# Patient Record
Sex: MALE | Race: WHITE | NOT HISPANIC OR LATINO | URBAN - METROPOLITAN AREA
[De-identification: names, ages, dates, MRNs, and addresses within clinical notes are randomized per-mention and may not be internally consistent; named-entity substitution may affect disease eponyms.]

---

## 2014-08-29 RX ORDER — ASPIRIN/CALCIUM CARB/MAGNESIUM 324 MG
1 TABLET ORAL
Qty: 0 | Refills: 0 | COMMUNITY
Start: 2014-08-29

## 2018-12-04 ENCOUNTER — EMERGENCY (EMERGENCY)
Facility: HOSPITAL | Age: 83
LOS: 1 days | Discharge: HOME | End: 2018-12-04
Attending: EMERGENCY MEDICINE | Admitting: EMERGENCY MEDICINE
Payer: COMMERCIAL

## 2018-12-04 VITALS
DIASTOLIC BLOOD PRESSURE: 69 MMHG | OXYGEN SATURATION: 100 % | HEART RATE: 85 BPM | RESPIRATION RATE: 16 BRPM | SYSTOLIC BLOOD PRESSURE: 120 MMHG | TEMPERATURE: 98 F

## 2018-12-04 VITALS
DIASTOLIC BLOOD PRESSURE: 66 MMHG | WEIGHT: 195.11 LBS | SYSTOLIC BLOOD PRESSURE: 130 MMHG | HEIGHT: 67 IN | RESPIRATION RATE: 16 BRPM | HEART RATE: 71 BPM | OXYGEN SATURATION: 100 % | TEMPERATURE: 98 F

## 2018-12-04 LAB
ALBUMIN SERPL ELPH-MCNC: 3.8 G/DL — SIGNIFICANT CHANGE UP (ref 3.3–5)
ALP SERPL-CCNC: 53 U/L — SIGNIFICANT CHANGE UP (ref 40–120)
ALT FLD-CCNC: 18 U/L — SIGNIFICANT CHANGE UP (ref 12–78)
ANION GAP SERPL CALC-SCNC: 10 MMOL/L — SIGNIFICANT CHANGE UP (ref 5–17)
APPEARANCE UR: CLEAR — SIGNIFICANT CHANGE UP
AST SERPL-CCNC: 14 U/L — LOW (ref 15–37)
BASOPHILS # BLD AUTO: 0.02 K/UL — SIGNIFICANT CHANGE UP (ref 0–0.2)
BASOPHILS NFR BLD AUTO: 0.2 % — SIGNIFICANT CHANGE UP (ref 0–2)
BILIRUB SERPL-MCNC: 0.5 MG/DL — SIGNIFICANT CHANGE UP (ref 0.2–1.2)
BILIRUB UR-MCNC: NEGATIVE — SIGNIFICANT CHANGE UP
BUN SERPL-MCNC: 13 MG/DL — SIGNIFICANT CHANGE UP (ref 7–23)
CALCIUM SERPL-MCNC: 9.4 MG/DL — SIGNIFICANT CHANGE UP (ref 8.5–10.1)
CHLORIDE SERPL-SCNC: 104 MMOL/L — SIGNIFICANT CHANGE UP (ref 96–108)
CO2 SERPL-SCNC: 25 MMOL/L — SIGNIFICANT CHANGE UP (ref 22–31)
COLOR SPEC: YELLOW — SIGNIFICANT CHANGE UP
CREAT SERPL-MCNC: 0.84 MG/DL — SIGNIFICANT CHANGE UP (ref 0.5–1.3)
DIFF PNL FLD: NEGATIVE — SIGNIFICANT CHANGE UP
EOSINOPHIL # BLD AUTO: 0.1 K/UL — SIGNIFICANT CHANGE UP (ref 0–0.5)
EOSINOPHIL NFR BLD AUTO: 1 % — SIGNIFICANT CHANGE UP (ref 0–6)
GLUCOSE SERPL-MCNC: 146 MG/DL — HIGH (ref 70–99)
GLUCOSE UR QL: NEGATIVE — SIGNIFICANT CHANGE UP
HCT VFR BLD CALC: 32.8 % — LOW (ref 39–50)
HGB BLD-MCNC: 11.2 G/DL — LOW (ref 13–17)
IMM GRANULOCYTES NFR BLD AUTO: 0.5 % — SIGNIFICANT CHANGE UP (ref 0–1.5)
KETONES UR-MCNC: NEGATIVE — SIGNIFICANT CHANGE UP
LEUKOCYTE ESTERASE UR-ACNC: NEGATIVE — SIGNIFICANT CHANGE UP
LYMPHOCYTES # BLD AUTO: 1.19 K/UL — SIGNIFICANT CHANGE UP (ref 1–3.3)
LYMPHOCYTES # BLD AUTO: 11.5 % — LOW (ref 13–44)
MCHC RBC-ENTMCNC: 29.5 PG — SIGNIFICANT CHANGE UP (ref 27–34)
MCHC RBC-ENTMCNC: 34.1 GM/DL — SIGNIFICANT CHANGE UP (ref 32–36)
MCV RBC AUTO: 86.3 FL — SIGNIFICANT CHANGE UP (ref 80–100)
MONOCYTES # BLD AUTO: 0.73 K/UL — SIGNIFICANT CHANGE UP (ref 0–0.9)
MONOCYTES NFR BLD AUTO: 7.1 % — SIGNIFICANT CHANGE UP (ref 2–14)
NEUTROPHILS # BLD AUTO: 8.26 K/UL — HIGH (ref 1.8–7.4)
NEUTROPHILS NFR BLD AUTO: 79.7 % — HIGH (ref 43–77)
NITRITE UR-MCNC: NEGATIVE — SIGNIFICANT CHANGE UP
NRBC # BLD: 0 /100 WBCS — SIGNIFICANT CHANGE UP (ref 0–0)
PH UR: 5 — SIGNIFICANT CHANGE UP (ref 5–8)
PLATELET # BLD AUTO: 285 K/UL — SIGNIFICANT CHANGE UP (ref 150–400)
POTASSIUM SERPL-MCNC: 4 MMOL/L — SIGNIFICANT CHANGE UP (ref 3.5–5.3)
POTASSIUM SERPL-SCNC: 4 MMOL/L — SIGNIFICANT CHANGE UP (ref 3.5–5.3)
PROT SERPL-MCNC: 7.5 G/DL — SIGNIFICANT CHANGE UP (ref 6–8.3)
PROT UR-MCNC: NEGATIVE — SIGNIFICANT CHANGE UP
RBC # BLD: 3.8 M/UL — LOW (ref 4.2–5.8)
RBC # FLD: 12.5 % — SIGNIFICANT CHANGE UP (ref 10.3–14.5)
SODIUM SERPL-SCNC: 139 MMOL/L — SIGNIFICANT CHANGE UP (ref 135–145)
SP GR SPEC: 1.01 — SIGNIFICANT CHANGE UP (ref 1.01–1.02)
UROBILINOGEN FLD QL: NEGATIVE — SIGNIFICANT CHANGE UP
WBC # BLD: 10.35 K/UL — SIGNIFICANT CHANGE UP (ref 3.8–10.5)
WBC # FLD AUTO: 10.35 K/UL — SIGNIFICANT CHANGE UP (ref 3.8–10.5)

## 2018-12-04 PROCEDURE — 99283 EMERGENCY DEPT VISIT LOW MDM: CPT

## 2018-12-04 PROCEDURE — 99284 EMERGENCY DEPT VISIT MOD MDM: CPT

## 2018-12-04 PROCEDURE — 80053 COMPREHEN METABOLIC PANEL: CPT

## 2018-12-04 PROCEDURE — 36415 COLL VENOUS BLD VENIPUNCTURE: CPT

## 2018-12-04 PROCEDURE — 85027 COMPLETE CBC AUTOMATED: CPT

## 2018-12-04 PROCEDURE — 81003 URINALYSIS AUTO W/O SCOPE: CPT

## 2018-12-04 RX ORDER — SODIUM CHLORIDE 9 MG/ML
1000 INJECTION INTRAMUSCULAR; INTRAVENOUS; SUBCUTANEOUS ONCE
Qty: 0 | Refills: 0 | Status: COMPLETED | OUTPATIENT
Start: 2018-12-04 | End: 2018-12-04

## 2018-12-04 RX ADMIN — Medication 1 ENEMA: at 19:30

## 2018-12-04 RX ADMIN — SODIUM CHLORIDE 1000 MILLILITER(S): 9 INJECTION INTRAMUSCULAR; INTRAVENOUS; SUBCUTANEOUS at 18:21

## 2018-12-04 RX ADMIN — SODIUM CHLORIDE 2000 MILLILITER(S): 9 INJECTION INTRAMUSCULAR; INTRAVENOUS; SUBCUTANEOUS at 17:21

## 2018-12-04 NOTE — ED ADULT NURSE NOTE - PMH
BPH (benign prostatic hypertrophy)  with seed implant  Diabetes    DMII (diabetes mellitus, type 2)    HLD (hyperlipidemia)    HTN (hypertension)    HTN (hypertension)    Polyp of colon

## 2018-12-04 NOTE — ED PROVIDER NOTE - OBJECTIVE STATEMENT
Pt is and 82 yo male, pt states had some loose stool on saturday, but since with no bm since sunday at 4am and urge to defecate. pt with rectal pain, feels like stool stuck.  pt with abd pain cramping at times, no f/c, n/v.  no a/a factors, no dysuria.  pt unable to urinate since 2pm

## 2018-12-04 NOTE — ED PROVIDER NOTE - PROGRESS NOTE DETAILS
pt disimpacted digitally and enema placed and pt placed on bed pan pt with large bm in ed. pt now with no rectal pain, abd benign, d/c on colace fu gi, urol--pt able to urinate after bm

## 2018-12-04 NOTE — ED PROVIDER NOTE - PMH
BPH (benign prostatic hypertrophy)  with seed implant  Diabetes    DMII (diabetes mellitus, type 2)    HLD (hyperlipidemia)    HTN (hypertension)    HTN (hypertension)    Polyp of colon    Prostate cancer

## 2018-12-04 NOTE — ED ADULT NURSE REASSESSMENT NOTE - NS ED NURSE REASSESS COMMENT FT1
patient verbalized improvement and verbalized large about of stool passed out
1930 assumed care at this time, patient c/o rectal pain. Dr Juárez to do rectal impaction followed with Fleet enema

## 2018-12-04 NOTE — ED ADULT NURSE NOTE - NSIMPLEMENTINTERV_GEN_ALL_ED
Implemented All Fall Risk Interventions:  [place] to call system. Call bell, personal items and telephone within reach. Instruct patient to call for assistance. Room bathroom lighting operational. Non-slip footwear when patient is off stretcher. Physically safe environment: no spills, clutter or unnecessary equipment. Stretcher in lowest position, wheels locked, appropriate side rails in place. Provide visual cue, wrist band, yellow gown, etc. Monitor gait and stability. Monitor for mental status changes and reorient to person, place, and time. Review medications for side effects contributing to fall risk. Reinforce activity limits and safety measures with patient and family.

## 2018-12-04 NOTE — ED PROVIDER NOTE - CHPI ED SYMPTOMS NEG
no chills/no abdominal distension/no vomiting/no nausea/no dysuria/no hematuria/no blood in stool/no burning urination/no fever

## 2018-12-04 NOTE — ED PROVIDER NOTE - MEDICAL DECISION MAKING DETAILS
fecal impaction, alek haque, on st cath 1.5 liters urine but pt had been urinating till noon.  no bm since sunday with rectal pain, disimpacted and urinating normally, d/c on colace fu gi, urology

## 2018-12-21 ENCOUNTER — APPOINTMENT (OUTPATIENT)
Dept: GASTROENTEROLOGY | Facility: CLINIC | Age: 83
End: 2018-12-21
Payer: MEDICARE

## 2018-12-21 VITALS
BODY MASS INDEX: 31.39 KG/M2 | SYSTOLIC BLOOD PRESSURE: 138 MMHG | HEIGHT: 67 IN | HEART RATE: 68 BPM | WEIGHT: 200 LBS | TEMPERATURE: 98 F | OXYGEN SATURATION: 96 % | DIASTOLIC BLOOD PRESSURE: 80 MMHG

## 2018-12-21 DIAGNOSIS — Z80.0 FAMILY HISTORY OF MALIGNANT NEOPLASM OF DIGESTIVE ORGANS: ICD-10-CM

## 2018-12-21 DIAGNOSIS — Z78.9 OTHER SPECIFIED HEALTH STATUS: ICD-10-CM

## 2018-12-21 DIAGNOSIS — D50.0 IRON DEFICIENCY ANEMIA SECONDARY TO BLOOD LOSS (CHRONIC): ICD-10-CM

## 2018-12-21 DIAGNOSIS — K56.41 FECAL IMPACTION: ICD-10-CM

## 2018-12-21 DIAGNOSIS — Z85.46 PERSONAL HISTORY OF MALIGNANT NEOPLASM OF PROSTATE: ICD-10-CM

## 2018-12-21 DIAGNOSIS — Z86.39 PERSONAL HISTORY OF OTHER ENDOCRINE, NUTRITIONAL AND METABOLIC DISEASE: ICD-10-CM

## 2018-12-21 DIAGNOSIS — R19.8 OTHER SPECIFIED SYMPTOMS AND SIGNS INVOLVING THE DIGESTIVE SYSTEM AND ABDOMEN: ICD-10-CM

## 2018-12-21 PROCEDURE — 99203 OFFICE O/P NEW LOW 30 MIN: CPT

## 2018-12-21 RX ORDER — METOPROLOL SUCCINATE 100 MG/1
100 TABLET, EXTENDED RELEASE ORAL
Qty: 30 | Refills: 0 | Status: ACTIVE | COMMUNITY
Start: 2017-12-04

## 2018-12-21 RX ORDER — DORZOLAMIDE HYDROCHLORIDE TIMOLOL MALEATE 20; 5 MG/ML; MG/ML
22.3-6.8 SOLUTION/ DROPS OPHTHALMIC
Qty: 10 | Refills: 0 | Status: ACTIVE | COMMUNITY
Start: 2017-08-31

## 2018-12-21 RX ORDER — IRON/IRON ASP GLY/FA/MV-MIN 38 125-25-1MG
TABLET ORAL
Refills: 0 | Status: ACTIVE | COMMUNITY

## 2018-12-21 RX ORDER — ATORVASTATIN CALCIUM 40 MG/1
40 TABLET, FILM COATED ORAL
Qty: 30 | Refills: 0 | Status: ACTIVE | COMMUNITY
Start: 2017-12-04

## 2018-12-21 RX ORDER — MELOXICAM 15 MG/1
15 TABLET ORAL
Qty: 30 | Refills: 0 | Status: ACTIVE | COMMUNITY
Start: 2018-12-13

## 2018-12-21 RX ORDER — SULFAMETHOXAZOLE AND TRIMETHOPRIM 800; 160 MG/1; MG/1
800-160 TABLET ORAL
Qty: 28 | Refills: 0 | Status: ACTIVE | COMMUNITY
Start: 2018-07-24

## 2018-12-21 RX ORDER — TAMSULOSIN HYDROCHLORIDE 0.4 MG/1
0.4 CAPSULE ORAL
Qty: 60 | Refills: 0 | Status: ACTIVE | COMMUNITY
Start: 2018-09-28

## 2018-12-21 RX ORDER — METFORMIN HYDROCHLORIDE 500 MG/1
500 TABLET, COATED ORAL
Qty: 90 | Refills: 0 | Status: ACTIVE | COMMUNITY
Start: 2017-12-04

## 2018-12-21 RX ORDER — POLYETHYLENE GLYCOL 3350, SODIUM SULFATE, SODIUM CHLORIDE, POTASSIUM CHLORIDE, ASCORBIC ACID, SODIUM ASCORBATE 7.5-2.691G
100 KIT ORAL
Qty: 1 | Refills: 0 | Status: ACTIVE | COMMUNITY
Start: 2018-12-21 | End: 1900-01-01

## 2018-12-21 RX ORDER — CRANBERRY FRUIT EXTRACT 650 MG
CAPSULE ORAL
Refills: 0 | Status: ACTIVE | COMMUNITY

## 2018-12-21 RX ORDER — ENZALUTAMIDE 40 MG/1
40 CAPSULE ORAL
Refills: 0 | Status: ACTIVE | COMMUNITY

## 2019-01-31 ENCOUNTER — OTHER (OUTPATIENT)
Age: 84
End: 2019-01-31

## 2019-02-06 ENCOUNTER — INPATIENT (INPATIENT)
Facility: HOSPITAL | Age: 84
LOS: 6 days | Discharge: HOME | DRG: 723 | End: 2019-02-13
Attending: HOSPITALIST | Admitting: HOSPITALIST
Payer: COMMERCIAL

## 2019-02-06 VITALS — WEIGHT: 195.11 LBS | HEIGHT: 68 IN

## 2019-02-06 LAB
ALBUMIN SERPL ELPH-MCNC: 3.7 G/DL — SIGNIFICANT CHANGE UP (ref 3.3–5)
ALP SERPL-CCNC: 63 U/L — SIGNIFICANT CHANGE UP (ref 40–120)
ALT FLD-CCNC: 24 U/L — SIGNIFICANT CHANGE UP (ref 12–78)
ANION GAP SERPL CALC-SCNC: 7 MMOL/L — SIGNIFICANT CHANGE UP (ref 5–17)
APPEARANCE UR: ABNORMAL
APTT BLD: 32.3 SEC — SIGNIFICANT CHANGE UP (ref 27.5–36.3)
AST SERPL-CCNC: 17 U/L — SIGNIFICANT CHANGE UP (ref 15–37)
BACTERIA # UR AUTO: ABNORMAL
BASOPHILS # BLD AUTO: 0.02 K/UL — SIGNIFICANT CHANGE UP (ref 0–0.2)
BASOPHILS NFR BLD AUTO: 0.3 % — SIGNIFICANT CHANGE UP (ref 0–2)
BILIRUB SERPL-MCNC: 0.2 MG/DL — SIGNIFICANT CHANGE UP (ref 0.2–1.2)
BILIRUB UR-MCNC: NEGATIVE — SIGNIFICANT CHANGE UP
BLD GP AB SCN SERPL QL: SIGNIFICANT CHANGE UP
BUN SERPL-MCNC: 18 MG/DL — SIGNIFICANT CHANGE UP (ref 7–23)
CALCIUM SERPL-MCNC: 8.8 MG/DL — SIGNIFICANT CHANGE UP (ref 8.5–10.1)
CHLORIDE SERPL-SCNC: 107 MMOL/L — SIGNIFICANT CHANGE UP (ref 96–108)
CO2 SERPL-SCNC: 28 MMOL/L — SIGNIFICANT CHANGE UP (ref 22–31)
COLOR SPEC: ABNORMAL
CREAT SERPL-MCNC: 0.94 MG/DL — SIGNIFICANT CHANGE UP (ref 0.5–1.3)
DIFF PNL FLD: ABNORMAL
EOSINOPHIL # BLD AUTO: 0.25 K/UL — SIGNIFICANT CHANGE UP (ref 0–0.5)
EOSINOPHIL NFR BLD AUTO: 3.7 % — SIGNIFICANT CHANGE UP (ref 0–6)
EPI CELLS # UR: SIGNIFICANT CHANGE UP
GLUCOSE SERPL-MCNC: 155 MG/DL — HIGH (ref 70–99)
GLUCOSE UR QL: NEGATIVE — SIGNIFICANT CHANGE UP
HCT VFR BLD CALC: 31.3 % — LOW (ref 39–50)
HGB BLD-MCNC: 9.9 G/DL — LOW (ref 13–17)
IMM GRANULOCYTES NFR BLD AUTO: 0.4 % — SIGNIFICANT CHANGE UP (ref 0–1.5)
INR BLD: 1.27 RATIO — HIGH (ref 0.88–1.16)
KETONES UR-MCNC: ABNORMAL
LEUKOCYTE ESTERASE UR-ACNC: NEGATIVE — SIGNIFICANT CHANGE UP
LYMPHOCYTES # BLD AUTO: 1.38 K/UL — SIGNIFICANT CHANGE UP (ref 1–3.3)
LYMPHOCYTES # BLD AUTO: 20.2 % — SIGNIFICANT CHANGE UP (ref 13–44)
MCHC RBC-ENTMCNC: 28.5 PG — SIGNIFICANT CHANGE UP (ref 27–34)
MCHC RBC-ENTMCNC: 31.6 GM/DL — LOW (ref 32–36)
MCV RBC AUTO: 90.2 FL — SIGNIFICANT CHANGE UP (ref 80–100)
MONOCYTES # BLD AUTO: 0.64 K/UL — SIGNIFICANT CHANGE UP (ref 0–0.9)
MONOCYTES NFR BLD AUTO: 9.4 % — SIGNIFICANT CHANGE UP (ref 2–14)
NEUTROPHILS # BLD AUTO: 4.51 K/UL — SIGNIFICANT CHANGE UP (ref 1.8–7.4)
NEUTROPHILS NFR BLD AUTO: 66 % — SIGNIFICANT CHANGE UP (ref 43–77)
NITRITE UR-MCNC: NEGATIVE — SIGNIFICANT CHANGE UP
NRBC # BLD: 0 /100 WBCS — SIGNIFICANT CHANGE UP (ref 0–0)
PH UR: 7 — SIGNIFICANT CHANGE UP (ref 5–8)
PLATELET # BLD AUTO: 291 K/UL — SIGNIFICANT CHANGE UP (ref 150–400)
POTASSIUM SERPL-MCNC: 3.7 MMOL/L — SIGNIFICANT CHANGE UP (ref 3.5–5.3)
POTASSIUM SERPL-SCNC: 3.7 MMOL/L — SIGNIFICANT CHANGE UP (ref 3.5–5.3)
PROT SERPL-MCNC: 6.8 G/DL — SIGNIFICANT CHANGE UP (ref 6–8.3)
PROT UR-MCNC: 500 MG/DL
PROTHROM AB SERPL-ACNC: 14.5 SEC — HIGH (ref 10–12.9)
RBC # BLD: 3.47 M/UL — LOW (ref 4.2–5.8)
RBC # FLD: 13.5 % — SIGNIFICANT CHANGE UP (ref 10.3–14.5)
RBC CASTS # UR COMP ASSIST: >50 /HPF (ref 0–4)
SODIUM SERPL-SCNC: 142 MMOL/L — SIGNIFICANT CHANGE UP (ref 135–145)
SP GR SPEC: 1 — LOW (ref 1.01–1.02)
UROBILINOGEN FLD QL: NEGATIVE — SIGNIFICANT CHANGE UP
WBC # BLD: 6.83 K/UL — SIGNIFICANT CHANGE UP (ref 3.8–10.5)
WBC # FLD AUTO: 6.83 K/UL — SIGNIFICANT CHANGE UP (ref 3.8–10.5)
WBC UR QL: SIGNIFICANT CHANGE UP

## 2019-02-06 PROCEDURE — 99285 EMERGENCY DEPT VISIT HI MDM: CPT

## 2019-02-06 RX ORDER — SODIUM CHLORIDE 9 MG/ML
3 INJECTION INTRAMUSCULAR; INTRAVENOUS; SUBCUTANEOUS ONCE
Qty: 0 | Refills: 0 | Status: COMPLETED | OUTPATIENT
Start: 2019-02-06 | End: 2019-02-06

## 2019-02-06 RX ADMIN — SODIUM CHLORIDE 3 MILLILITER(S): 9 INJECTION INTRAMUSCULAR; INTRAVENOUS; SUBCUTANEOUS at 21:53

## 2019-02-06 NOTE — ED PROVIDER NOTE - PROGRESS NOTE DETAILS
Pt with persistent bleeding, will admit for further vicente /e shannon. Dw Dr Elizondo ( [facility]), they will see pt, admit to med. Dr Dr Barraza ([facility]), will see pt to admit.

## 2019-02-06 NOTE — ED PROVIDER NOTE - OBJECTIVE STATEMENT
82 yo M p/w sp felix placed 2 weeks ago for urinary obst. Pt with occ slight hematuria on / off. Now with inc hematuria x past 2 days, worse with steady dark blood in urine since this afternoon. No abd pain. no n/v/d. no neck / back pain. No agg/allev factors. no other inj or co.

## 2019-02-07 ENCOUNTER — OTHER (OUTPATIENT)
Age: 84
End: 2019-02-07

## 2019-02-07 DIAGNOSIS — D64.9 ANEMIA, UNSPECIFIED: ICD-10-CM

## 2019-02-07 DIAGNOSIS — R31.9 HEMATURIA, UNSPECIFIED: ICD-10-CM

## 2019-02-07 DIAGNOSIS — C61 MALIGNANT NEOPLASM OF PROSTATE: ICD-10-CM

## 2019-02-07 DIAGNOSIS — I10 ESSENTIAL (PRIMARY) HYPERTENSION: ICD-10-CM

## 2019-02-07 DIAGNOSIS — R31.0 GROSS HEMATURIA: ICD-10-CM

## 2019-02-07 DIAGNOSIS — E11.9 TYPE 2 DIABETES MELLITUS WITHOUT COMPLICATIONS: ICD-10-CM

## 2019-02-07 DIAGNOSIS — Z29.9 ENCOUNTER FOR PROPHYLACTIC MEASURES, UNSPECIFIED: ICD-10-CM

## 2019-02-07 DIAGNOSIS — N40.0 BENIGN PROSTATIC HYPERPLASIA WITHOUT LOWER URINARY TRACT SYMPTOMS: ICD-10-CM

## 2019-02-07 DIAGNOSIS — E78.5 HYPERLIPIDEMIA, UNSPECIFIED: ICD-10-CM

## 2019-02-07 LAB
ALBUMIN SERPL ELPH-MCNC: 3.4 G/DL — SIGNIFICANT CHANGE UP (ref 3.3–5)
ALP SERPL-CCNC: 56 U/L — SIGNIFICANT CHANGE UP (ref 40–120)
ALT FLD-CCNC: 21 U/L — SIGNIFICANT CHANGE UP (ref 12–78)
ANION GAP SERPL CALC-SCNC: 6 MMOL/L — SIGNIFICANT CHANGE UP (ref 5–17)
AST SERPL-CCNC: 14 U/L — LOW (ref 15–37)
BILIRUB SERPL-MCNC: 0.5 MG/DL — SIGNIFICANT CHANGE UP (ref 0.2–1.2)
BUN SERPL-MCNC: 15 MG/DL — SIGNIFICANT CHANGE UP (ref 7–23)
CALCIUM SERPL-MCNC: 8.6 MG/DL — SIGNIFICANT CHANGE UP (ref 8.5–10.1)
CHLORIDE SERPL-SCNC: 109 MMOL/L — HIGH (ref 96–108)
CO2 SERPL-SCNC: 29 MMOL/L — SIGNIFICANT CHANGE UP (ref 22–31)
CREAT SERPL-MCNC: 0.85 MG/DL — SIGNIFICANT CHANGE UP (ref 0.5–1.3)
GLUCOSE SERPL-MCNC: 144 MG/DL — HIGH (ref 70–99)
HCT VFR BLD CALC: 29.6 % — LOW (ref 39–50)
HGB BLD-MCNC: 9.5 G/DL — LOW (ref 13–17)
MAGNESIUM SERPL-MCNC: 2.1 MG/DL — SIGNIFICANT CHANGE UP (ref 1.6–2.6)
MCHC RBC-ENTMCNC: 28.9 PG — SIGNIFICANT CHANGE UP (ref 27–34)
MCHC RBC-ENTMCNC: 32.1 GM/DL — SIGNIFICANT CHANGE UP (ref 32–36)
MCV RBC AUTO: 90 FL — SIGNIFICANT CHANGE UP (ref 80–100)
NRBC # BLD: 0 /100 WBCS — SIGNIFICANT CHANGE UP (ref 0–0)
PHOSPHATE SERPL-MCNC: 4.3 MG/DL — SIGNIFICANT CHANGE UP (ref 2.5–4.5)
PLATELET # BLD AUTO: 273 K/UL — SIGNIFICANT CHANGE UP (ref 150–400)
POTASSIUM SERPL-MCNC: 4 MMOL/L — SIGNIFICANT CHANGE UP (ref 3.5–5.3)
POTASSIUM SERPL-SCNC: 4 MMOL/L — SIGNIFICANT CHANGE UP (ref 3.5–5.3)
PROT SERPL-MCNC: 6.3 G/DL — SIGNIFICANT CHANGE UP (ref 6–8.3)
RBC # BLD: 3.29 M/UL — LOW (ref 4.2–5.8)
RBC # FLD: 13.4 % — SIGNIFICANT CHANGE UP (ref 10.3–14.5)
SODIUM SERPL-SCNC: 144 MMOL/L — SIGNIFICANT CHANGE UP (ref 135–145)
WBC # BLD: 7.22 K/UL — SIGNIFICANT CHANGE UP (ref 3.8–10.5)
WBC # FLD AUTO: 7.22 K/UL — SIGNIFICANT CHANGE UP (ref 3.8–10.5)

## 2019-02-07 PROCEDURE — 12345: CPT | Mod: NC

## 2019-02-07 PROCEDURE — 99223 1ST HOSP IP/OBS HIGH 75: CPT | Mod: AI,GC

## 2019-02-07 PROCEDURE — 93010 ELECTROCARDIOGRAM REPORT: CPT

## 2019-02-07 RX ORDER — INSULIN LISPRO 100/ML
VIAL (ML) SUBCUTANEOUS AT BEDTIME
Qty: 0 | Refills: 0 | Status: DISCONTINUED | OUTPATIENT
Start: 2019-02-07 | End: 2019-02-13

## 2019-02-07 RX ORDER — TAMSULOSIN HYDROCHLORIDE 0.4 MG/1
2 CAPSULE ORAL
Qty: 0 | Refills: 0 | COMMUNITY

## 2019-02-07 RX ORDER — DEXTROSE 50 % IN WATER 50 %
15 SYRINGE (ML) INTRAVENOUS ONCE
Qty: 0 | Refills: 0 | Status: DISCONTINUED | OUTPATIENT
Start: 2019-02-07 | End: 2019-02-13

## 2019-02-07 RX ORDER — GLUCAGON INJECTION, SOLUTION 0.5 MG/.1ML
1 INJECTION, SOLUTION SUBCUTANEOUS ONCE
Qty: 0 | Refills: 0 | Status: DISCONTINUED | OUTPATIENT
Start: 2019-02-07 | End: 2019-02-13

## 2019-02-07 RX ORDER — DEXTROSE 50 % IN WATER 50 %
25 SYRINGE (ML) INTRAVENOUS ONCE
Qty: 0 | Refills: 0 | Status: DISCONTINUED | OUTPATIENT
Start: 2019-02-07 | End: 2019-02-13

## 2019-02-07 RX ORDER — DOCUSATE SODIUM 100 MG
100 CAPSULE ORAL THREE TIMES A DAY
Qty: 0 | Refills: 0 | Status: DISCONTINUED | OUTPATIENT
Start: 2019-02-07 | End: 2019-02-13

## 2019-02-07 RX ORDER — GABAPENTIN 400 MG/1
1 CAPSULE ORAL
Qty: 0 | Refills: 0 | COMMUNITY

## 2019-02-07 RX ORDER — METOPROLOL TARTRATE 50 MG
50 TABLET ORAL
Qty: 0 | Refills: 0 | Status: DISCONTINUED | OUTPATIENT
Start: 2019-02-07 | End: 2019-02-13

## 2019-02-07 RX ORDER — ATORVASTATIN CALCIUM 80 MG/1
1 TABLET, FILM COATED ORAL
Qty: 0 | Refills: 0 | COMMUNITY

## 2019-02-07 RX ORDER — SENNA PLUS 8.6 MG/1
2 TABLET ORAL AT BEDTIME
Qty: 0 | Refills: 0 | Status: DISCONTINUED | OUTPATIENT
Start: 2019-02-07 | End: 2019-02-10

## 2019-02-07 RX ORDER — ATORVASTATIN CALCIUM 80 MG/1
80 TABLET, FILM COATED ORAL AT BEDTIME
Qty: 0 | Refills: 0 | Status: DISCONTINUED | OUTPATIENT
Start: 2019-02-07 | End: 2019-02-13

## 2019-02-07 RX ORDER — METOPROLOL TARTRATE 50 MG
1 TABLET ORAL
Qty: 0 | Refills: 0 | COMMUNITY

## 2019-02-07 RX ORDER — METFORMIN HYDROCHLORIDE 850 MG/1
1 TABLET ORAL
Qty: 0 | Refills: 0 | COMMUNITY

## 2019-02-07 RX ORDER — INSULIN LISPRO 100/ML
VIAL (ML) SUBCUTANEOUS
Qty: 0 | Refills: 0 | Status: DISCONTINUED | OUTPATIENT
Start: 2019-02-07 | End: 2019-02-13

## 2019-02-07 RX ORDER — DEXTROSE 50 % IN WATER 50 %
12.5 SYRINGE (ML) INTRAVENOUS ONCE
Qty: 0 | Refills: 0 | Status: DISCONTINUED | OUTPATIENT
Start: 2019-02-07 | End: 2019-02-13

## 2019-02-07 RX ORDER — SODIUM CHLORIDE 9 MG/ML
1000 INJECTION, SOLUTION INTRAVENOUS
Qty: 0 | Refills: 0 | Status: DISCONTINUED | OUTPATIENT
Start: 2019-02-07 | End: 2019-02-13

## 2019-02-07 RX ORDER — TAMSULOSIN HYDROCHLORIDE 0.4 MG/1
0.8 CAPSULE ORAL AT BEDTIME
Qty: 0 | Refills: 0 | Status: DISCONTINUED | OUTPATIENT
Start: 2019-02-07 | End: 2019-02-13

## 2019-02-07 RX ADMIN — Medication 50 MILLIGRAM(S): at 06:43

## 2019-02-07 RX ADMIN — Medication 50 MILLIGRAM(S): at 18:38

## 2019-02-07 RX ADMIN — Medication 100 MILLIGRAM(S): at 06:43

## 2019-02-07 RX ADMIN — Medication 100 MILLIGRAM(S): at 22:57

## 2019-02-07 RX ADMIN — ATORVASTATIN CALCIUM 80 MILLIGRAM(S): 80 TABLET, FILM COATED ORAL at 22:56

## 2019-02-07 RX ADMIN — TAMSULOSIN HYDROCHLORIDE 0.8 MILLIGRAM(S): 0.4 CAPSULE ORAL at 22:57

## 2019-02-07 NOTE — H&P ADULT - PROBLEM SELECTOR PLAN 8
IMPROVE VTE Individual Risk Assessment          RISK                                                          Points  [  ] Previous VTE                                                3  [  ] Thrombophilia                                             2  [  ] Lower limb paralysis                                   2        (unable to hold up >15 seconds)    [ x ] Current Cancer                                             2         (within 6 months)  [  ] Immobilization > 24 hrs                              1  [  ] ICU/CCU stay > 24 hours                             1  [ x ] Age > 60                                                         1    IMPROVE VTE Score: 3    Given setting of gross hematuria, will hold AC. Place on SCDs.

## 2019-02-07 NOTE — H&P ADULT - PROBLEM SELECTOR PLAN 7
- Chronic.  - Hold home metformin.  - POCT glucose monitoring.  - Low dose insulin sliding scale.  - DASH, carb conscious diet.

## 2019-02-07 NOTE — H&P ADULT - PROBLEM SELECTOR PLAN 2
- Acute, Hgb 9.9, baseline 11.2.   - Secondary to acute blood loss from hematuria.  - Monitor for symptoms of acute blood loss.  - Follow up AM H/H.   - Consider need for transfusion.

## 2019-02-07 NOTE — H&P ADULT - PROBLEM SELECTOR PLAN 1
- Acute.  - History of prostate cancer, s/p seeding, currently on Extandi.  - Continuous bladder irrigation began in ED.  - Admit to [facility].   - Continue bladder irrigation.   - Urology Dr. Elizondo consulted.

## 2019-02-07 NOTE — H&P ADULT - NSHPSOCIALHISTORY_GEN_ALL_CORE
Patient lives at home with wife.   Ambulates without assistance, sometimes uses wife's cane.   Ex-smoker, quit 50 years ago. Smoked since age 16, 1ppd.

## 2019-02-07 NOTE — PATIENT PROFILE ADULT - VISION (WITH CORRECTIVE LENSES IF THE PATIENT USUALLY WEARS THEM):
Partially impaired: cannot see medication labels or newsprint, but can see obstacles in path, and the surrounding layout; can count fingers at arm's length/with corrective eye glassess

## 2019-02-07 NOTE — H&P ADULT - NSHPPHYSICALEXAM_GEN_ALL_CORE
Physical Exam:  General: well-developed, well-nourished, NAD  HEENT: normocephalic, atraumatic, EOMI, moist mucous membranes   Neck: supple, non-tender, no masses  Neurology: AAOx3, sensation intact  Respiratory: clear to auscultation bilaterally; no wheezes, rhonchi, or rales  CV: regular rate and rhythm, soft S1/S2, no murmurs, rubs, or gallops  Abdominal: soft, non-tender, non-distended, bowel sounds decreased; Timmons in place draining   Extremities: no clubbing, cyanosis, or edema; palpable peripheral pulses  Musculoskeletal: no joint erythema or warmth, no joint swelling   Skin: warm, dry, normal color

## 2019-02-07 NOTE — H&P ADULT - PROBLEM SELECTOR PLAN 3
- Chronic, s/p seeding.  - On Extandi oral chemotherapy.  - Patient to bring in chemo medication from home.

## 2019-02-07 NOTE — PROGRESS NOTE ADULT - ASSESSMENT
Patient is an 83 year old male with PMH of prostate cancer s/p seeding and on oral chemotherapy, HTN, HLD, DM type 2 who presents to the ED with gross hematuria and acute blood loss anemia, requiring CBI

## 2019-02-07 NOTE — CONSULT NOTE ADULT - PROBLEM SELECTOR RECOMMENDATION 2
With clot retention. Continue CBI and manual irrigation as needed. With clot retention. Continue CBI and manual irrigation as needed. Stool softener.

## 2019-02-07 NOTE — H&P ADULT - NSHPREVIEWOFSYSTEMS_GEN_ALL_CORE
Constitutional: Denies fever, chills, general malaise, weight loss, weight gain  Respiratory: Denies shortness of breath, dyspnea on exertion, cough, sputum production, wheezing  Cardiovascular: Denies chest pain, palpitations; + lower extremity edema  Gastrointestinal: +constipation, abdominal fullness; Denies nausea, vomiting, diarrhea, abdominal pain, melena, hematochezia   Genitourinary: +burning at Timmons site, hematuria, incontinence; Denies frequency, urgency  Musculoskeletal: Denies muscle pains, muscle weakness, joint pain or swelling  Neurologic: Denies syncope, loss of consciousness, headache, weakness, dizziness  Psychiatric: Denies feeling anxious, depressed, suicidal, or homicidal thoughts  Endocrine: Denies cold or heat intolerance, polydipsia, polyphagia   ROS negative except as noted above

## 2019-02-07 NOTE — H&P ADULT - PMH
BPH (benign prostatic hypertrophy)  with seed implant  DMII (diabetes mellitus, type 2)    HLD (hyperlipidemia)    HTN (hypertension)    Polyp of colon    Prostate cancer  with seed implant and on Xtandi

## 2019-02-07 NOTE — CONSULT NOTE ADULT - PROBLEM SELECTOR RECOMMENDATION 9
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

## 2019-02-07 NOTE — PROGRESS NOTE ADULT - PROBLEM SELECTOR PLAN 1
- Acute.  - History of prostate cancer, s/p seeding, currently on Extandi and Lupron Q 3 months (next dose in April)  -c/w continuous bladder irrigation   Appreciate urology input - Acute.  - History of prostate cancer, s/p seeding, currently on Extandi and Lupron Q 3 months (next dose in April)  -c/w continuous bladder irrigation   Appreciate urology input  f/u urine cx - Acute.  - History of prostate cancer, s/p seeding, currently on Extandi and Lupron Q 3 months (next dose in April)  -c/w continuous bladder irrigation   Appreciate urology input  f/u urine cx  holding ASA and Meloxicam at this time

## 2019-02-07 NOTE — CONSULT NOTE ADULT - SUBJECTIVE AND OBJECTIVE BOX
CHIEF COMPLAINT: Gross hematuria    HISTORY OF PRESENT ILLNESS:    The patient is an 83 year old male cared for by Dr. Can El with gross hematuria and clot retention.  Over the past several weeks he has been developing worsening gross hematuria.  CT Abdomen and Pelvis with/without IV contrast 19 performed at [facility] demonstrated small right renal calculi and diffse bladder wall thickeining of uncertain etiology.  He has a h/o prostate cancer diagnosed in  treated with XRT and brachytherapy with subsequent development of recurrence on ADT with Lupron. 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  He has known osseous metastases treated with Xofigo s/p Provenge.  He has had a Timmons in place for gross hematuria and clot retention.  Timmons became occluded and he presented to the ED for evaluation. Timmons changed to 3 way and CBI initiated.    PAST MEDICAL & SURGICAL HISTORY:  Prostate cancer: with seed implant and on Xtandi  HLD (hyperlipidemia)  BPH (benign prostatic hypertrophy): with seed implant  DMII (diabetes mellitus, type 2)  HTN (hypertension)  Polyp of colon  Bilateral cataracts      REVIEW OF SYSTEMS:    CONSTITUTIONAL: No weakness, fevers or chills  EYES/ENT: No visual changes;  No vertigo or throat pain   NECK: No pain or stiffness  RESPIRATORY: No cough, wheezing, hemoptysis; No shortness of breath  CARDIOVASCULAR: No chest pain or palpitations  GASTROINTESTINAL: No abdominal or epigastric pain. No nausea, vomiting, or hematemesis; No diarrhea or constipation. No melena or hematochezia.  GENITOURINARY:as above  NEUROLOGICAL: No numbness or weakness  SKIN: No itching, burning, rashes, or lesions   All other review of systems is negative unless indicated above.    MEDICATIONS  (STANDING):  atorvastatin 80 milliGRAM(s) Oral at bedtime  dextrose 5%. 1000 milliLiter(s) (50 mL/Hr) IV Continuous <Continuous>  dextrose 50% Injectable 12.5 Gram(s) IV Push once  dextrose 50% Injectable 25 Gram(s) IV Push once  dextrose 50% Injectable 25 Gram(s) IV Push once  docusate sodium 100 milliGRAM(s) Oral three times a day  insulin lispro (HumaLOG) corrective regimen sliding scale   SubCutaneous three times a day before meals  insulin lispro (HumaLOG) corrective regimen sliding scale   SubCutaneous at bedtime  metoprolol tartrate 50 milliGRAM(s) Oral two times a day    MEDICATIONS  (PRN):  dextrose 40% Gel 15 Gram(s) Oral once PRN Blood Glucose LESS THAN 70 milliGRAM(s)/deciliter  glucagon  Injectable 1 milliGRAM(s) IntraMuscular once PRN Glucose LESS THAN 70 milligrams/deciliter  senna 2 Tablet(s) Oral at bedtime PRN Constipation      Allergies    No Known Allergies    Intolerances        SOCIAL HISTORY:    FAMILY HISTORY:  Family history of MI (myocardial infarction) (Father)      Vital Signs Last 24 Hrs  T(C): 36.8 (2019 06:18), Max: 36.9 (2019 20:15)  T(F): 98.2 (2019 06:18), Max: 98.4 (2019 20:15)  HR: 65 (2019 06:18) (65 - 77)  BP: 167/65 (2019 06:18) (146/92 - 167/65)  BP(mean): --  RR: 16 (2019 06:18) (14 - 18)  SpO2: 97% (2019 06:18) (96% - 98%)    PHYSICAL EXAM:    Constitutional: NAD, well-developed  HEENT:  Normal Hearing  Neck: No LAD, No JVD  Back: Normal spine flexure, No CVA tenderness  Abd: Soft, NT/ND, No CVAT  : Normal phallus,open meatus,bilateral descended testes, no masses. 3 way Timmons in place. Urine bloody  Extremities: No peripheral edema  Neurological: A/O x 3  Psychiatric: Normal mood, normal affect  Skin: No rashes    LABS:                        9.9    6.83  )-----------( 291      ( 2019 22:30 )             31.3     02-06    142  |  107  |  18  ----------------------------<  155<H>  3.7   |  28  |  0.94    Ca    8.8      2019 22:30    TPro  6.8  /  Alb  3.7  /  TBili  0.2  /  DBili  x   /  AST  17  /  ALT  24  /  AlkPhos  63  02-06    PT/INR - ( 2019 22:30 )   PT: 14.5 sec;   INR: 1.27 ratio         PTT - ( 2019 22:30 )  PTT:32.3 sec  Urinalysis Basic - ( 2019 22:30 )    Color: Red / Appearance: bloody / S.005 / pH: x  Gluc: x / Ketone: Small  / Bili: Negative / Urobili: Negative   Blood: x / Protein: 500 mg/dL / Nitrite: Negative   Leuk Esterase: Negative / RBC: >50 /HPF / WBC 3-5   Sq Epi: x / Non Sq Epi: Occasional / Bacteria: Few CHIEF COMPLAINT: Gross hematuria    HISTORY OF PRESENT ILLNESS:    The patient is an 83 year old male cared for by Dr. Can El with gross hematuria and clot retention.  Over the past several weeks he has been developing worsening gross hematuria.  CT Abdomen and Pelvis with/without IV contrast 19 performed at [facility], [place] demonstrated small right renal calculi and diffse bladder wall thickeining of uncertain etiology.  He has a h/o prostate cancer diagnosed in  treated with XRT and brachytherapy with subsequent development of recurrence on ADT with Lupron. 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  He has known osseous metastases treated with Xofigo s/p Provenge.  He has had a Timmons in place for gross hematuria and clot retention.  Timmons became occluded and he presented to the ED for evaluation. Timmons changed to 3 way and CBI initiated. Patient has been straining to defacate exacerbating the bleeding.    PAST MEDICAL & SURGICAL HISTORY:  Prostate cancer: with seed implant and on Xtandi  HLD (hyperlipidemia)  BPH (benign prostatic hypertrophy): with seed implant  DMII (diabetes mellitus, type 2)  HTN (hypertension)  Polyp of colon  Bilateral cataracts      REVIEW OF SYSTEMS:    CONSTITUTIONAL: No weakness, fevers or chills  EYES/ENT: No visual changes;  No vertigo or throat pain   NECK: No pain or stiffness  RESPIRATORY: No cough, wheezing, hemoptysis; No shortness of breath  CARDIOVASCULAR: No chest pain or palpitations  GASTROINTESTINAL: No abdominal or epigastric pain. No nausea, vomiting, or hematemesis; No diarrhea or constipation. No melena or hematochezia.  GENITOURINARY:as above  NEUROLOGICAL: No numbness or weakness  SKIN: No itching, burning, rashes, or lesions   All other review of systems is negative unless indicated above.    MEDICATIONS  (STANDING):  atorvastatin 80 milliGRAM(s) Oral at bedtime  dextrose 5%. 1000 milliLiter(s) (50 mL/Hr) IV Continuous <Continuous>  dextrose 50% Injectable 12.5 Gram(s) IV Push once  dextrose 50% Injectable 25 Gram(s) IV Push once  dextrose 50% Injectable 25 Gram(s) IV Push once  docusate sodium 100 milliGRAM(s) Oral three times a day  insulin lispro (HumaLOG) corrective regimen sliding scale   SubCutaneous three times a day before meals  insulin lispro (HumaLOG) corrective regimen sliding scale   SubCutaneous at bedtime  metoprolol tartrate 50 milliGRAM(s) Oral two times a day    MEDICATIONS  (PRN):  dextrose 40% Gel 15 Gram(s) Oral once PRN Blood Glucose LESS THAN 70 milliGRAM(s)/deciliter  glucagon  Injectable 1 milliGRAM(s) IntraMuscular once PRN Glucose LESS THAN 70 milligrams/deciliter  senna 2 Tablet(s) Oral at bedtime PRN Constipation      Allergies    No Known Allergies    Intolerances        SOCIAL HISTORY:    FAMILY HISTORY:  Family history of MI (myocardial infarction) (Father)      Vital Signs Last 24 Hrs  T(C): 36.8 (2019 06:18), Max: 36.9 (2019 20:15)  T(F): 98.2 (2019 06:18), Max: 98.4 (2019 20:15)  HR: 65 (2019 06:18) (65 - 77)  BP: 167/65 (2019 06:18) (146/92 - 167/65)  BP(mean): --  RR: 16 (2019 06:18) (14 - 18)  SpO2: 97% (2019 06:18) (96% - 98%)    PHYSICAL EXAM:    Constitutional: NAD, well-developed  HEENT:  Normal Hearing  Neck: No LAD, No JVD  Back: Normal spine flexure, No CVA tenderness  Abd: Soft, NT/ND, No CVAT  : Normal phallus,open meatus,bilateral descended testes, no masses. 3 way Timmons in place. Urine bloody. Clots irrigated until urine punch colored on CBI  Extremities: Mild peripheral edema LEs  Neurological: A/O x 3  Psychiatric: Normal mood, normal affect  Skin: No rashes    LABS:                        9.9    6.83  )-----------( 291      ( 2019 22:30 )             31.3     02-06    142  |  107  |  18  ----------------------------<  155<H>  3.7   |  28  |  0.94    Ca    8.8      2019 22:30    TPro  6.8  /  Alb  3.7  /  TBili  0.2  /  DBili  x   /  AST  17  /  ALT  24  /  AlkPhos  63  02-06    PT/INR - ( 2019 22:30 )   PT: 14.5 sec;   INR: 1.27 ratio         PTT - ( 2019 22:30 )  PTT:32.3 sec  Urinalysis Basic - ( 2019 22:30 )    Color: Red / Appearance: bloody / S.005 / pH: x  Gluc: x / Ketone: Small  / Bili: Negative / Urobili: Negative   Blood: x / Protein: 500 mg/dL / Nitrite: Negative   Leuk Esterase: Negative / RBC: >50 /HPF / WBC 3-5   Sq Epi: x / Non Sq Epi: Occasional / Bacteria: Few

## 2019-02-07 NOTE — PROGRESS NOTE ADULT - SUBJECTIVE AND OBJECTIVE BOX
Contact Number: [phone]    Patient is a 83y old  Male who presents with a chief complaint of Hematuria (2019 06:42)      SUBJECTIVE / OVERNIGHT EVENTS: punched colored urine noted in Timmons tubing, has CBI running. Denies abd pain, CP, SOB. Denies fevers, chills.    MEDICATIONS  (STANDING):  atorvastatin 80 milliGRAM(s) Oral at bedtime  dextrose 5%. 1000 milliLiter(s) (50 mL/Hr) IV Continuous <Continuous>  dextrose 50% Injectable 12.5 Gram(s) IV Push once  dextrose 50% Injectable 25 Gram(s) IV Push once  dextrose 50% Injectable 25 Gram(s) IV Push once  docusate sodium 100 milliGRAM(s) Oral three times a day  insulin lispro (HumaLOG) corrective regimen sliding scale   SubCutaneous three times a day before meals  insulin lispro (HumaLOG) corrective regimen sliding scale   SubCutaneous at bedtime  metoprolol tartrate 50 milliGRAM(s) Oral two times a day    MEDICATIONS  (PRN):  dextrose 40% Gel 15 Gram(s) Oral once PRN Blood Glucose LESS THAN 70 milliGRAM(s)/deciliter  glucagon  Injectable 1 milliGRAM(s) IntraMuscular once PRN Glucose LESS THAN 70 milligrams/deciliter  senna 2 Tablet(s) Oral at bedtime PRN Constipation      Vital Signs Last 24 Hrs  T(C): 36.8 (2019 06:18), Max: 36.9 (2019 20:15)  T(F): 98.2 (2019 06:18), Max: 98.4 (2019 20:15)  HR: 65 (2019 06:18) (65 - 77)  BP: 167/65 (2019 06:18) (146/92 - 167/65)  BP(mean): --  RR: 16 (2019 06:18) (14 - 18)  SpO2: 97% (2019 06:18) (96% - 98%)  CAPILLARY BLOOD GLUCOSE        I&O's Summary    2019 07:01  -  2019 07:00  --------------------------------------------------------  IN: 6000 mL / OUT: 6400 mL / NET: -400 mL        PHYSICAL EXAM:  GENERAL: NAD, well-developed  EYES: EOMI, PERRLA, conjunctiva and sclera clear  NECK: Supple, No JVD  CHEST/LUNG: Clear to auscultation bilaterally; No wheeze  HEART: Regular rate and rhythm; No murmurs, rubs, or gallops  ABDOMEN: Soft, Nontender, Nondistended; Bowel sounds present  EXTREMITIES:  2+ Peripheral Pulses, No clubbing, cyanosis. Mild pitting edema b/l  PSYCH: AAOx3  NEUROLOGY: no gross sensory deficits to light touch; muscle strength 5/5 b/l UE (handgrip, elbow ext/flex and LE (DF/PF, KE/KF, HF/HE)      LABS:                        9.5    7.22  )-----------( 273      ( 2019 07:18 )             29.6     02-07    144  |  109<H>  |  15  ----------------------------<  144<H>  4.0   |  29  |  0.85    Ca    8.6      2019 07:18  Phos  4.3     02-07  Mg     2.1     02-07    TPro  6.3  /  Alb  3.4  /  TBili  0.5  /  DBili  x   /  AST  14<L>  /  ALT  21  /  AlkPhos  56  02-07    LIVER FUNCTIONS - ( 2019 07:18 )  Alb: 3.4 g/dL / Pro: 6.3 g/dL / ALK PHOS: 56 U/L / ALT: 21 U/L / AST: 14 U/L / GGT: x           PT/INR - ( 2019 22:30 )   PT: 14.5 sec;   INR: 1.27 ratio         PTT - ( 2019 22:30 )  PTT:32.3 sec      Urinalysis Basic - ( 2019 22:30 )    Color: Red / Appearance: bloody / S.005 / pH: x  Gluc: x / Ketone: Small  / Bili: Negative / Urobili: Negative   Blood: x / Protein: 500 mg/dL / Nitrite: Negative   Leuk Esterase: Negative / RBC: >50 /HPF / WBC 3-5   Sq Epi: x / Non Sq Epi: Occasional / Bacteria: Few        RADIOLOGY & ADDITIONAL TESTS:    Imaging Personally Reviewed:    Consultant(s) Notes Reviewed:      Care Discussed with Consultants/Other Providers:

## 2019-02-07 NOTE — H&P ADULT - ASSESSMENT
Patient is an 83 year old male with PMH of prostate cancer s/p seeding and on oral chemotherapy, HTN, HLD, DM type 2 who presents to the ED with complaints of hematuria that began this evening. Patient admitted to [facility] for hematuria, on continuous bladder irrigation.

## 2019-02-07 NOTE — H&P ADULT - HISTORY OF PRESENT ILLNESS
Patient is an 83 year old male with PMH of prostate cancer s/p seeding and on oral chemotherapy, HTN, HLD, DM type 2 who presents to the ED with complaints of hematuria that began this evening before supper. Patient states that he has been getting clots in his urine for the past month and had seen his urologist who placed a Timmons 2 weeks ago for urinary obstruction. Patient continued to notice clots of blood in the leg bag at times and occasionally had bright red urine. Patient states that today he had copious amounts of dark blood "pouring into the Timmons bag." He felt abdominal cramps, shaky and nauseous at that time. Patient has also had increased LE edema for the past month which has improved. Currently, patient complains of constipation and a burning sensation at his urethra, as well as bladder fullness due to the increased IV fluids he has been receiving in the ED. He denies fevers, chills, nausea, vomiting, abdominal pain, headache, dizziness, diarrhea, melena, hematochezia.     Vital Signs  T(C): 36.9 (2019 20:15), Max: 36.9 (2019 20:15)  T(F): 98.4 (2019 20:15), Max: 98.4 (2019 20:15)  HR: 65 (2019 20:15) (65 - 65)  BP: 166/94 (2019 20:15) (166/94 - 166/94)  RR: 14 (2019 20:15) (14 - 14)  SpO2: 96% (2019 20:15) (96% - 96%)    Pertinent labs: Hgb 9.9 (baseline 11), glucose 155.    Urinalysis Basic  Color: Red / Appearance: bloody / S.005 / pH: x  Gluc: x / Ketone: Small  / Bili: Negative / Urobili: Negative   Blood: x / Protein: 500 mg/dL / Nitrite: Negative   Leuk Esterase: Negative / RBC: >50 /HPF / WBC 3-5   Sq Epi: x / Non Sq Epi: Occasional / Bacteria: Few    In the ED, patient received: continuous bladder irrigation.     Patient admitted to [facility] for hematuria, anemia.

## 2019-02-08 ENCOUNTER — TRANSCRIPTION ENCOUNTER (OUTPATIENT)
Age: 84
End: 2019-02-08

## 2019-02-08 ENCOUNTER — APPOINTMENT (OUTPATIENT)
Dept: GASTROENTEROLOGY | Facility: AMBULATORY MEDICAL SERVICES | Age: 84
End: 2019-02-08

## 2019-02-08 PROBLEM — C61 MALIGNANT NEOPLASM OF PROSTATE: Chronic | Status: ACTIVE | Noted: 2018-12-04

## 2019-02-08 LAB
ANION GAP SERPL CALC-SCNC: 6 MMOL/L — SIGNIFICANT CHANGE UP (ref 5–17)
BUN SERPL-MCNC: 13 MG/DL — SIGNIFICANT CHANGE UP (ref 7–23)
CALCIUM SERPL-MCNC: 8.6 MG/DL — SIGNIFICANT CHANGE UP (ref 8.5–10.1)
CHLORIDE SERPL-SCNC: 106 MMOL/L — SIGNIFICANT CHANGE UP (ref 96–108)
CO2 SERPL-SCNC: 30 MMOL/L — SIGNIFICANT CHANGE UP (ref 22–31)
CREAT SERPL-MCNC: 0.93 MG/DL — SIGNIFICANT CHANGE UP (ref 0.5–1.3)
GLUCOSE SERPL-MCNC: 183 MG/DL — HIGH (ref 70–99)
HBA1C BLD-MCNC: 6.9 % — HIGH (ref 4–5.6)
HCT VFR BLD CALC: 29.3 % — LOW (ref 39–50)
HGB BLD-MCNC: 9.4 G/DL — LOW (ref 13–17)
MCHC RBC-ENTMCNC: 29 PG — SIGNIFICANT CHANGE UP (ref 27–34)
MCHC RBC-ENTMCNC: 32.1 GM/DL — SIGNIFICANT CHANGE UP (ref 32–36)
MCV RBC AUTO: 90.4 FL — SIGNIFICANT CHANGE UP (ref 80–100)
NRBC # BLD: 0 /100 WBCS — SIGNIFICANT CHANGE UP (ref 0–0)
PLATELET # BLD AUTO: 262 K/UL — SIGNIFICANT CHANGE UP (ref 150–400)
POTASSIUM SERPL-MCNC: 3.8 MMOL/L — SIGNIFICANT CHANGE UP (ref 3.5–5.3)
POTASSIUM SERPL-SCNC: 3.8 MMOL/L — SIGNIFICANT CHANGE UP (ref 3.5–5.3)
RBC # BLD: 3.24 M/UL — LOW (ref 4.2–5.8)
RBC # FLD: 13.6 % — SIGNIFICANT CHANGE UP (ref 10.3–14.5)
SODIUM SERPL-SCNC: 142 MMOL/L — SIGNIFICANT CHANGE UP (ref 135–145)
WBC # BLD: 12.42 K/UL — HIGH (ref 3.8–10.5)
WBC # FLD AUTO: 12.42 K/UL — HIGH (ref 3.8–10.5)

## 2019-02-08 PROCEDURE — 99233 SBSQ HOSP IP/OBS HIGH 50: CPT

## 2019-02-08 RX ORDER — ACETAMINOPHEN 500 MG
650 TABLET ORAL ONCE
Qty: 0 | Refills: 0 | Status: COMPLETED | OUTPATIENT
Start: 2019-02-08 | End: 2019-02-08

## 2019-02-08 RX ORDER — ACETAMINOPHEN 500 MG
650 TABLET ORAL EVERY 6 HOURS
Qty: 0 | Refills: 0 | Status: DISCONTINUED | OUTPATIENT
Start: 2019-02-08 | End: 2019-02-13

## 2019-02-08 RX ADMIN — Medication 1: at 12:04

## 2019-02-08 RX ADMIN — Medication 100 MILLIGRAM(S): at 13:33

## 2019-02-08 RX ADMIN — Medication 650 MILLIGRAM(S): at 12:06

## 2019-02-08 RX ADMIN — Medication 100 MILLIGRAM(S): at 21:50

## 2019-02-08 RX ADMIN — Medication 1: at 17:08

## 2019-02-08 RX ADMIN — Medication 1: at 08:04

## 2019-02-08 RX ADMIN — TAMSULOSIN HYDROCHLORIDE 0.8 MILLIGRAM(S): 0.4 CAPSULE ORAL at 21:50

## 2019-02-08 RX ADMIN — Medication 50 MILLIGRAM(S): at 06:01

## 2019-02-08 RX ADMIN — Medication 650 MILLIGRAM(S): at 12:35

## 2019-02-08 RX ADMIN — Medication 50 MILLIGRAM(S): at 17:24

## 2019-02-08 RX ADMIN — Medication 100 MILLIGRAM(S): at 06:01

## 2019-02-08 RX ADMIN — ATORVASTATIN CALCIUM 80 MILLIGRAM(S): 80 TABLET, FILM COATED ORAL at 21:50

## 2019-02-08 NOTE — DISCHARGE NOTE ADULT - PROVIDER TOKENS
FREE:[LAST:[Joy],FIRST:[Mike],PHONE:[phone]],FAX:[phone]   [phone]    -],ADDRESS:[[address] [place] [id number]]],PROVIDER:[[id number]]]

## 2019-02-08 NOTE — DISCHARGE NOTE ADULT - MEDICATION SUMMARY - MEDICATIONS TO TAKE
I will START or STAY ON the medications listed below when I get home from the hospital:    meloxicam 15 mg oral tablet  -- 1 tab(s) by mouth once a day  -- Indication: For Pain control    aspirin 81 mg oral delayed release tablet  -- 1 tab(s) by mouth once a day.  Resume when cleared by your urologist.  -- Indication: For Primary cardiac prevention    Flomax 0.4 mg oral capsule  -- 2 cap(s) by mouth once a day  -- Indication: For Prostate cancer    gabapentin 300 mg oral capsule  -- 1 cap(s) by mouth once a day (at bedtime)  -- Indication: For Pain control    metFORMIN 500 mg oral tablet  -- 1 tab(s) by mouth 3 times a day  -- Indication: For DMII (diabetes mellitus, type 2)    atorvastatin 40 mg oral tablet  -- 1 tab(s) by mouth once a day  -- Indication: For HLD (hyperlipidemia)    Toprol- mg oral tablet, extended release  -- 1 tab(s) by mouth once a day  -- Indication: For HTN (hypertension)    ferrous sulfate 325 mg (65 mg elemental iron) oral tablet  -- 1 tab(s) by mouth 2 times a day  -- Indication: For Anemia, unspecified type    docusate sodium 100 mg oral capsule  -- 1 cap(s) by mouth 3 times a day  -- Indication: For constipation    polyethylene glycol 3350 oral powder for reconstitution  -- 17 gram(s) by mouth once a day, As needed, Constipation  -- Indication: For constipation    senna oral tablet  -- 2 tab(s) by mouth once a day (at bedtime)  -- Indication: For constipation    cyanocobalamin 1000 mcg oral tablet  -- 1 tab(s) by mouth once a day  -- Indication: For Anemia, unspecified type I will START or STAY ON the medications listed below when I get home from the hospital:    aspirin 81 mg oral delayed release tablet  -- 1 tab(s) by mouth once a day.  Resume when cleared by your urologist.  -- Indication: For Primary cardiac prevention    meloxicam 15 mg oral tablet  -- 1 tab(s) by mouth once a day.  Resume when cleared by your urologist  -- Indication: For Pain control    Flomax 0.4 mg oral capsule  -- 2 cap(s) by mouth once a day  -- Indication: For Prostate cancer    gabapentin 300 mg oral capsule  -- 1 cap(s) by mouth once a day (at bedtime)  -- Indication: For Pain control    metFORMIN 500 mg oral tablet  -- 1 tab(s) by mouth 3 times a day  -- Indication: For DMII (diabetes mellitus, type 2)    atorvastatin 40 mg oral tablet  -- 1 tab(s) by mouth once a day  -- Indication: For HLD (hyperlipidemia)    Toprol- mg oral tablet, extended release  -- 1 tab(s) by mouth once a day  -- Indication: For HTN (hypertension)    ferrous sulfate 325 mg (65 mg elemental iron) oral tablet  -- 1 tab(s) by mouth 2 times a day  -- Indication: For Anemia, unspecified type    docusate sodium 100 mg oral capsule  -- 1 cap(s) by mouth 3 times a day  -- Indication: For constipation    polyethylene glycol 3350 oral powder for reconstitution  -- 17 gram(s) by mouth once a day, As needed, Constipation  -- Indication: For constipation    senna oral tablet  -- 2 tab(s) by mouth once a day (at bedtime)  -- Indication: For constipation    ciprofloxacin 500 mg oral tablet  -- 1 tab(s) by mouth every 12 hours  -- Indication: For UTI    cyanocobalamin 1000 mcg oral tablet  -- 1 tab(s) by mouth once a day  -- Indication: For Anemia, unspecified type

## 2019-02-08 NOTE — DISCHARGE NOTE ADULT - ADDITIONAL INSTRUCTIONS
Please follow up with your urologist,  on Feb 14th  Please follow up with your medical oncologist, Dr. Mike Abbott in 1-2 weeks. Please follow up with your urologist,  on Feb 14th  Please follow up with your medical oncologist, Dr. Mike Abbott in 1 week.

## 2019-02-08 NOTE — DISCHARGE NOTE ADULT - CARE PROVIDERS DIRECT ADDRESSES
,[email or web address],[email or web address]

## 2019-02-08 NOTE — DISCHARGE NOTE ADULT - CARE PLAN
Principal Discharge DX:	Hematuria, gross  Goal:	To prevent future bleeding.  Assessment and plan of treatment:	follow up with PMD in 1 week and Urology on 2/14, activity as tolerable, low salt/low cholesterol/limited carbohydrate diet.  Secondary Diagnosis:	Anemia, unspecified type  Goal:	continue iron and vitamin B12 supplements  Secondary Diagnosis:	DMII (diabetes mellitus, type 2)  Goal:	resume your oral hypoglycemic medication  Secondary Diagnosis:	HTN (hypertension)  Goal:	resume your antihypertensive medication  Secondary Diagnosis:	HLD (hyperlipidemia)  Goal:	resume your statin medication  Secondary Diagnosis:	Prostate cancer  Assessment and plan of treatment:	Follow up with Medical oncology in 1-2 weeks Principal Discharge DX:	Hematuria, gross  Goal:	Complete course of antibiotic  Assessment and plan of treatment:	follow up with PMD in 1 week and Urology tomorrow, activity as tolerable, low salt/low cholesterol/limited carbohydrate diet.  Secondary Diagnosis:	Anemia, unspecified type  Goal:	continue iron and vitamin B12 supplements  Secondary Diagnosis:	DMII (diabetes mellitus, type 2)  Goal:	resume your oral hypoglycemic medication  Secondary Diagnosis:	HTN (hypertension)  Goal:	resume your antihypertensive medication  Secondary Diagnosis:	HLD (hyperlipidemia)  Goal:	resume your statin medication  Secondary Diagnosis:	Prostate cancer  Assessment and plan of treatment:	Follow up with Medical oncology in 1 week

## 2019-02-08 NOTE — DISCHARGE NOTE ADULT - SECONDARY DIAGNOSIS.
Anemia, unspecified type DMII (diabetes mellitus, type 2) HTN (hypertension) HLD (hyperlipidemia) Prostate cancer

## 2019-02-08 NOTE — PROGRESS NOTE ADULT - PROBLEM SELECTOR PLAN 3
- Chronic, s/p seeding.  metastatic to bone   On Extandi and Lupron  - pt has appt to f/u with his urologist, Dr. Can El on 2/14- spoke with Dr. El. Will likely need medical oncology eval as outpt to discuss additional options for treatment of his metastatic prostate cancer - Chronic, s/p seeding.  metastatic to bone   On Extandi and Lupron  - pt has appt to f/u with his urologist, Dr. Can El on 2/14- spoke with Dr. El. Will likely need medical oncology eval as outpt to discuss additional options for treatment of his metastatic prostate cancer. Pt has seen Dr. Mike Abbott in [place], [place]

## 2019-02-08 NOTE — PROGRESS NOTE ADULT - PROBLEM SELECTOR PLAN 1
- Acute.  - History of prostate cancer, s/p seeding, currently on Extandi and Lupron Q 3 months (next dose in April)  -plan to c/w continuous bladder irrigation at a lower rate  Appreciate urology input  f/u urine cx  holding ASA and Meloxicam at this time

## 2019-02-08 NOTE — PROGRESS NOTE ADULT - SUBJECTIVE AND OBJECTIVE BOX
Contact Number: [phone]    Patient is a 83y old  Male who presents with a chief complaint of Hematuria (2019 06:50)      SUBJECTIVE / OVERNIGHT EVENTS: hematuria improving; denies suprapubic/abdominal tenderness. endorses l-sided mild sacral pain.      MEDICATIONS  (STANDING):  atorvastatin 80 milliGRAM(s) Oral at bedtime  dextrose 5%. 1000 milliLiter(s) (50 mL/Hr) IV Continuous <Continuous>  dextrose 50% Injectable 12.5 Gram(s) IV Push once  dextrose 50% Injectable 25 Gram(s) IV Push once  dextrose 50% Injectable 25 Gram(s) IV Push once  docusate sodium 100 milliGRAM(s) Oral three times a day  insulin lispro (HumaLOG) corrective regimen sliding scale   SubCutaneous three times a day before meals  insulin lispro (HumaLOG) corrective regimen sliding scale   SubCutaneous at bedtime  metoprolol tartrate 50 milliGRAM(s) Oral two times a day  tamsulosin 0.8 milliGRAM(s) Oral at bedtime    MEDICATIONS  (PRN):  dextrose 40% Gel 15 Gram(s) Oral once PRN Blood Glucose LESS THAN 70 milliGRAM(s)/deciliter  glucagon  Injectable 1 milliGRAM(s) IntraMuscular once PRN Glucose LESS THAN 70 milligrams/deciliter  senna 2 Tablet(s) Oral at bedtime PRN Constipation      Vital Signs Last 24 Hrs  T(C): 37.1 (2019 08:19), Max: 37.4 (2019 00:02)  T(F): 98.7 (2019 08:19), Max: 99.3 (2019 00:02)  HR: 64 (2019 08:19) (64 - 74)  BP: 103/59 (2019 08:19) (103/59 - 177/72)  BP(mean): --  RR: 17 (2019 08:19) (17 - 17)  SpO2: 96% (2019 08:19) (96% - 96%)  CAPILLARY BLOOD GLUCOSE      POCT Blood Glucose.: 172 mg/dL (2019 07:56)  POCT Blood Glucose.: 158 mg/dL (2019 21:23)  POCT Blood Glucose.: 134 mg/dL (2019 16:46)  POCT Blood Glucose.: 152 mg/dL (2019 12:47)    I&O's Summary    2019 07:01  -  2019 07:00  --------------------------------------------------------  IN: 0 mL / OUT: 6250 mL / NET: -6250 mL      PHYSICAL EXAM:  GENERAL: NAD, well-developed  EYES: EOMI, PERRLA, conjunctiva and sclera clear  NECK: Supple, No JVD  CHEST/LUNG: Clear to auscultation bilaterally; No wheeze  HEART: Regular rate and rhythm; No murmurs  ABDOMEN: Soft, Nontender, Nondistended; Bowel sounds present  EXTREMITIES:  2+ Peripheral Pulses, No clubbing, cyanosis; mild LE edema  PSYCH: AAOx3  NEUROLOGY: no gross sensory deficits to light touch  Muscle strength 5/5 b/l UE (handgrip and elbow flexion/ext) and LE (DF/PF/KE/KF/HF/HE)  No clonus   Speech: fluent      LABS:                        9.4    12.42 )-----------( 262      ( 2019 05:55 )             29.3     02-08    142  |  106  |  13  ----------------------------<  183<H>  3.8   |  30  |  0.93    Ca    8.6      2019 05:55  Phos  4.3     02-07  Mg     2.1     02-07    TPro  6.3  /  Alb  3.4  /  TBili  0.5  /  DBili  x   /  AST  14<L>  /  ALT  21  /  AlkPhos  56  02-07    LIVER FUNCTIONS - ( 2019 07:18 )  Alb: 3.4 g/dL / Pro: 6.3 g/dL / ALK PHOS: 56 U/L / ALT: 21 U/L / AST: 14 U/L / GGT: x           PT/INR - ( 2019 22:30 )   PT: 14.5 sec;   INR: 1.27 ratio         PTT - ( 2019 22:30 )  PTT:32.3 sec      Urinalysis Basic - ( 2019 22:30 )    Color: Red / Appearance: bloody / S.005 / pH: x  Gluc: x / Ketone: Small  / Bili: Negative / Urobili: Negative   Blood: x / Protein: 500 mg/dL / Nitrite: Negative   Leuk Esterase: Negative / RBC: >50 /HPF / WBC 3-5   Sq Epi: x / Non Sq Epi: Occasional / Bacteria: Few          RADIOLOGY & ADDITIONAL TESTS:    Imaging Personally Reviewed:    Consultant(s) Notes Reviewed:      Care Discussed with Consultants/Other Providers: Dr. Can El

## 2019-02-08 NOTE — DISCHARGE NOTE ADULT - MEDICATION SUMMARY - MEDICATIONS TO CHANGE
I will SWITCH the dose or number of times a day I take the medications listed below when I get home from the hospital:    atorvastatin 80 mg oral tablet  -- 1 tab(s) by mouth once a day (at bedtime)    metoprolol tartrate 50 mg oral tablet  -- 1 tab(s) by mouth 2 times a day

## 2019-02-08 NOTE — DISCHARGE NOTE ADULT - PATIENT PORTAL LINK FT
You can access the [facility] Portal, offered by [facility], by registering with the following website: [email or web address]

## 2019-02-08 NOTE — DISCHARGE NOTE ADULT - CARE PROVIDER_API CALL
Mike Hamilton  [address] [place] [address]  Phone: [phone]  Fax: (   )    -  Follow Up Time:     Can El)  Urology  [address] [place] [address]  Phone: [phone]  Fax: [phone]  Follow Up Time:

## 2019-02-08 NOTE — PROGRESS NOTE ADULT - PROBLEM SELECTOR PLAN 6
- Chronic.   - Continue home statin.
- Chronic.   - Continue home statin.
Closed reduction and pericutaneous pinning/yes

## 2019-02-08 NOTE — PROGRESS NOTE ADULT - SUBJECTIVE AND OBJECTIVE BOX
INTERVAL HPI/OVERNIGHT EVENTS: Hematuria improving. No need for manual irrigation overninght.    MEDICATIONS  (STANDING):  atorvastatin 80 milliGRAM(s) Oral at bedtime  dextrose 5%. 1000 milliLiter(s) (50 mL/Hr) IV Continuous <Continuous>  dextrose 50% Injectable 12.5 Gram(s) IV Push once  dextrose 50% Injectable 25 Gram(s) IV Push once  dextrose 50% Injectable 25 Gram(s) IV Push once  docusate sodium 100 milliGRAM(s) Oral three times a day  insulin lispro (HumaLOG) corrective regimen sliding scale   SubCutaneous three times a day before meals  insulin lispro (HumaLOG) corrective regimen sliding scale   SubCutaneous at bedtime  metoprolol tartrate 50 milliGRAM(s) Oral two times a day  tamsulosin 0.8 milliGRAM(s) Oral at bedtime    MEDICATIONS  (PRN):  dextrose 40% Gel 15 Gram(s) Oral once PRN Blood Glucose LESS THAN 70 milliGRAM(s)/deciliter  glucagon  Injectable 1 milliGRAM(s) IntraMuscular once PRN Glucose LESS THAN 70 milligrams/deciliter  senna 2 Tablet(s) Oral at bedtime PRN Constipation        Vital Signs Last 24 Hrs  T(C): 37.4 (2019 00:02), Max: 37.4 (2019 00:02)  T(F): 99.3 (2019 00:02), Max: 99.3 (2019 00:02)  HR: 74 (2019 00:02) (63 - 74)  BP: 173/69 (2019 00:02) (120/61 - 177/72)  BP(mean): --  RR: 17 (2019 00:02) (17 - 18)  SpO2: 96% (2019 00:02) (96% - 97%)    PHYSICAL EXAM:    ABDOMEN: Soft, NT/ND  GENITALIA: Timmons draining well. Urine punch colored on slowly running CBI.    LABS:                        9.4    12.42 )-----------( 262      ( 2019 05:55 )             29.3     02-08    142  |  106  |  13  ----------------------------<  183<H>  3.8   |  30  |  0.93    Ca    8.6      2019 05:55  Phos  4.3     02-07  Mg     2.1     02-07    TPro  6.3  /  Alb  3.4  /  TBili  0.5  /  DBili  x   /  AST  14<L>  /  ALT  21  /  AlkPhos  56  02-07    PT/INR - ( 2019 22:30 )   PT: 14.5 sec;   INR: 1.27 ratio         PTT - ( 2019 22:30 )  PTT:32.3 sec  Urinalysis Basic - ( 2019 22:30 )    Color: Red / Appearance: bloody / S.005 / pH: x  Gluc: x / Ketone: Small  / Bili: Negative / Urobili: Negative   Blood: x / Protein: 500 mg/dL / Nitrite: Negative   Leuk Esterase: Negative / RBC: >50 /HPF / WBC 3-5   Sq Epi: x / Non Sq Epi: Occasional / Bacteria: Few

## 2019-02-08 NOTE — DISCHARGE NOTE ADULT - HOSPITAL COURSE
Patient is an 83 year old male with PMH of metastatic prostate cancer (to bone) s/p seeding and on hormonal therapy , HTN, HLD, DM type 2 who presents to the ED with complaints of hematuria. Pt has had a Timmons for about 2 weeks that was placed for urinary retention. he came to ED when he noticed gross hematuria. He was evaluated by urology who placed him on CBI. Patient is an 83 year old male with PMH of metastatic prostate cancer (to bone) s/p seeding and on hormonal therapy , HTN, HLD, DM type 2 who presents to the ED with complaints of hematuria. Pt has had a Timmons for about 2 weeks that was placed for urinary retention.  He came to ED when he noticed gross hematuria. He was evaluated by urology who placed him on CBI.  Hemoglobin was monitored.  Urine cx grew out Klebsiella.  Pt. was started on PO antibiotic.  Hematuria improved.  Pt. had trial of void. Patient is an 83 year old male with PMH of metastatic prostate cancer (to bone) s/p seeding and on hormonal therapy , HTN, HLD, DM type 2 who presents to the ED with complaints of hematuria. Pt has had a Timmons for about 2 weeks that was placed for urinary retention.  He came to ED when he noticed gross hematuria. He was evaluated by urology who placed him on CBI.  Hemoglobin was monitored.  Urine cx grew out Klebsiella.  Pt. was started on PO antibiotic.  Hbg has stabilized.  Pt. had trial of void but failed.  Timmons catheter was replaced.        On day of discharge patient is in no distress.  Hbg stable.  Pt. advised to follow up with his urologist tomorrow.  Pt. is afebrile and hemodynamically stable.      Completion of discharge in 33 minutes.

## 2019-02-08 NOTE — DISCHARGE NOTE ADULT - PLAN OF CARE
To prevent future bleeding. follow up with PMD in 1 week and Urology on 2/14, activity as tolerable, low salt/low cholesterol/limited carbohydrate diet. continue iron and vitamin B12 supplements resume your oral hypoglycemic medication resume your antihypertensive medication resume your statin medication Follow up with Medical oncology in 1-2 weeks Complete course of antibiotic follow up with PMD in 1 week and Urology tomorrow, activity as tolerable, low salt/low cholesterol/limited carbohydrate diet. Follow up with Medical oncology in 1 week

## 2019-02-08 NOTE — PROGRESS NOTE ADULT - ASSESSMENT
Patient is an 83 year old male with PMH of metastatic prostate cancer s/p seeding and on oral chemotherapy, HTN, HLD, DM type 2 who presents to the ED with gross hematuria and acute blood loss anemia, requiring CBI

## 2019-02-09 LAB
-  AMIKACIN: SIGNIFICANT CHANGE UP
-  AMPICILLIN/SULBACTAM: SIGNIFICANT CHANGE UP
-  AMPICILLIN: SIGNIFICANT CHANGE UP
-  AZTREONAM: SIGNIFICANT CHANGE UP
-  CEFAZOLIN: SIGNIFICANT CHANGE UP
-  CEFEPIME: SIGNIFICANT CHANGE UP
-  CEFOXITIN: SIGNIFICANT CHANGE UP
-  CEFTRIAXONE: SIGNIFICANT CHANGE UP
-  CIPROFLOXACIN: SIGNIFICANT CHANGE UP
-  ERTAPENEM: SIGNIFICANT CHANGE UP
-  GENTAMICIN: SIGNIFICANT CHANGE UP
-  IMIPENEM: SIGNIFICANT CHANGE UP
-  LEVOFLOXACIN: SIGNIFICANT CHANGE UP
-  MEROPENEM: SIGNIFICANT CHANGE UP
-  NITROFURANTOIN: SIGNIFICANT CHANGE UP
-  PIPERACILLIN/TAZOBACTAM: SIGNIFICANT CHANGE UP
-  TIGECYCLINE: SIGNIFICANT CHANGE UP
-  TOBRAMYCIN: SIGNIFICANT CHANGE UP
-  TRIMETHOPRIM/SULFAMETHOXAZOLE: SIGNIFICANT CHANGE UP
ANION GAP SERPL CALC-SCNC: 7 MMOL/L — SIGNIFICANT CHANGE UP (ref 5–17)
BUN SERPL-MCNC: 16 MG/DL — SIGNIFICANT CHANGE UP (ref 7–23)
CALCIUM SERPL-MCNC: 8.6 MG/DL — SIGNIFICANT CHANGE UP (ref 8.5–10.1)
CHLORIDE SERPL-SCNC: 105 MMOL/L — SIGNIFICANT CHANGE UP (ref 96–108)
CO2 SERPL-SCNC: 28 MMOL/L — SIGNIFICANT CHANGE UP (ref 22–31)
CREAT SERPL-MCNC: 0.86 MG/DL — SIGNIFICANT CHANGE UP (ref 0.5–1.3)
CULTURE RESULTS: SIGNIFICANT CHANGE UP
GLUCOSE SERPL-MCNC: 183 MG/DL — HIGH (ref 70–99)
HCT VFR BLD CALC: 26.9 % — LOW (ref 39–50)
HCT VFR BLD CALC: 28.4 % — LOW (ref 39–50)
HGB BLD-MCNC: 8.6 G/DL — LOW (ref 13–17)
HGB BLD-MCNC: 9.3 G/DL — LOW (ref 13–17)
MCHC RBC-ENTMCNC: 28.6 PG — SIGNIFICANT CHANGE UP (ref 27–34)
MCHC RBC-ENTMCNC: 32 GM/DL — SIGNIFICANT CHANGE UP (ref 32–36)
MCV RBC AUTO: 89.4 FL — SIGNIFICANT CHANGE UP (ref 80–100)
METHOD TYPE: SIGNIFICANT CHANGE UP
NRBC # BLD: 0 /100 WBCS — SIGNIFICANT CHANGE UP (ref 0–0)
ORGANISM # SPEC MICROSCOPIC CNT: SIGNIFICANT CHANGE UP
ORGANISM # SPEC MICROSCOPIC CNT: SIGNIFICANT CHANGE UP
PLATELET # BLD AUTO: 228 K/UL — SIGNIFICANT CHANGE UP (ref 150–400)
POTASSIUM SERPL-MCNC: 3.7 MMOL/L — SIGNIFICANT CHANGE UP (ref 3.5–5.3)
POTASSIUM SERPL-SCNC: 3.7 MMOL/L — SIGNIFICANT CHANGE UP (ref 3.5–5.3)
RBC # BLD: 3.01 M/UL — LOW (ref 4.2–5.8)
RBC # FLD: 13.5 % — SIGNIFICANT CHANGE UP (ref 10.3–14.5)
SODIUM SERPL-SCNC: 140 MMOL/L — SIGNIFICANT CHANGE UP (ref 135–145)
SPECIMEN SOURCE: SIGNIFICANT CHANGE UP
WBC # BLD: 11.59 K/UL — HIGH (ref 3.8–10.5)
WBC # FLD AUTO: 11.59 K/UL — HIGH (ref 3.8–10.5)

## 2019-02-09 PROCEDURE — 99232 SBSQ HOSP IP/OBS MODERATE 35: CPT

## 2019-02-09 RX ADMIN — Medication 100 MILLIGRAM(S): at 06:19

## 2019-02-09 RX ADMIN — Medication 50 MILLIGRAM(S): at 06:19

## 2019-02-09 RX ADMIN — Medication 1: at 12:01

## 2019-02-09 RX ADMIN — Medication 100 MILLIGRAM(S): at 21:42

## 2019-02-09 RX ADMIN — SENNA PLUS 2 TABLET(S): 8.6 TABLET ORAL at 21:42

## 2019-02-09 RX ADMIN — Medication 1: at 08:19

## 2019-02-09 RX ADMIN — Medication 50 MILLIGRAM(S): at 17:47

## 2019-02-09 RX ADMIN — ATORVASTATIN CALCIUM 80 MILLIGRAM(S): 80 TABLET, FILM COATED ORAL at 21:42

## 2019-02-09 RX ADMIN — Medication 100 MILLIGRAM(S): at 13:04

## 2019-02-09 RX ADMIN — TAMSULOSIN HYDROCHLORIDE 0.8 MILLIGRAM(S): 0.4 CAPSULE ORAL at 21:42

## 2019-02-09 NOTE — PROGRESS NOTE ADULT - SUBJECTIVE AND OBJECTIVE BOX
PAST MEDICAL & SURGICAL HISTORY:  Prostate cancer: with seed implant and on Xtandi  HLD (hyperlipidemia)  BPH (benign prostatic hypertrophy): with seed implant  DMII (diabetes mellitus, type 2)  HTN (hypertension)  Polyp of colon  Bilateral cataracts      REVIEW OF SYSTEMS:    MEDICATIONS  (STANDING):  atorvastatin 80 milliGRAM(s) Oral at bedtime  dextrose 5%. 1000 milliLiter(s) (50 mL/Hr) IV Continuous <Continuous>  dextrose 50% Injectable 12.5 Gram(s) IV Push once  dextrose 50% Injectable 25 Gram(s) IV Push once  dextrose 50% Injectable 25 Gram(s) IV Push once  docusate sodium 100 milliGRAM(s) Oral three times a day  insulin lispro (HumaLOG) corrective regimen sliding scale   SubCutaneous three times a day before meals  insulin lispro (HumaLOG) corrective regimen sliding scale   SubCutaneous at bedtime  metoprolol tartrate 50 milliGRAM(s) Oral two times a day  tamsulosin 0.8 milliGRAM(s) Oral at bedtime    MEDICATIONS  (PRN):  acetaminophen   Tablet .. 650 milliGRAM(s) Oral every 6 hours PRN Severe Pain (7 - 10)  dextrose 40% Gel 15 Gram(s) Oral once PRN Blood Glucose LESS THAN 70 milliGRAM(s)/deciliter  glucagon  Injectable 1 milliGRAM(s) IntraMuscular once PRN Glucose LESS THAN 70 milligrams/deciliter  senna 2 Tablet(s) Oral at bedtime PRN Constipation      PE:    Allergies    No Known Allergies    Intolerances        FAMILY HISTORY:  Family history of MI (myocardial infarction) (Father)      Vital Signs Last 24 Hrs  T(C): 36.8 (09 Feb 2019 08:16), Max: 37.3 (08 Feb 2019 15:18)  T(F): 98.2 (09 Feb 2019 08:16), Max: 99.1 (08 Feb 2019 15:18)  HR: 60 (09 Feb 2019 08:16) (60 - 75)  BP: 133/78 (09 Feb 2019 08:16) (129/66 - 144/66)  BP(mean): --  RR: 18 (09 Feb 2019 08:16) (17 - 18)  SpO2: 98% (09 Feb 2019 08:16) (94% - 98%)    PHYSICAL EXAM: Abd soft, bladder not distended    LABS:                        8.6    11.59 )-----------( 228      ( 09 Feb 2019 06:12 )             26.9     02-09    140  |  105  |  16  ----------------------------<  183<H>  3.7   |  28  |  0.86    Ca    8.6      09 Feb 2019 06:12          Urine Culture:     RADIOLOGY & ADDITIONAL STUDIES:

## 2019-02-09 NOTE — PROGRESS NOTE ADULT - ASSESSMENT
Awake, alert  Has constipation issues  CBI bloody again, no clots  Labs noted, HGB lower at 8.6 now    CBI to continue, may need med for constipation

## 2019-02-09 NOTE — PROGRESS NOTE ADULT - SUBJECTIVE AND OBJECTIVE BOX
CHIEF COMPLAINT/INTERVAL HISTORY:  Pt. seen and evaluated for hematuria.  Pt. reported having some bladder pain last night which resolved after irrigation of felix catheter and clearing of clots.  Pt. currently on CBI.     REVIEW OF SYSTEMS:  No fever, CP, SOB, or abdominal pain.      Vital Signs Last 24 Hrs  T(C): 36.8 (09 Feb 2019 08:16), Max: 37.3 (08 Feb 2019 15:18)  T(F): 98.2 (09 Feb 2019 08:16), Max: 99.1 (08 Feb 2019 15:18)  HR: 60 (09 Feb 2019 08:16) (60 - 75)  BP: 133/78 (09 Feb 2019 08:16) (129/66 - 144/66)  BP(mean): --  RR: 18 (09 Feb 2019 08:16) (17 - 18)  SpO2: 98% (09 Feb 2019 08:16) (94% - 98%)    PHYSICAL EXAM:  GENERAL: NAD  HEENT: EOMI, hearing normal, conjunctiva and sclera clear  Chest: CTA bilaterally, no wheezing  CV: S1S2, RRR,   GI: soft, +BS, NT/ND  :  +felix catheter  Musculoskeletal: 1+ LE edema  Psychiatric: affect nL, mood nL  Skin: warm and dry    LABS:                        8.6    11.59 )-----------( 228      ( 09 Feb 2019 06:12 )             26.9     02-09    140  |  105  |  16  ----------------------------<  183<H>  3.7   |  28  |  0.86    Ca    8.6      09 Feb 2019 06:12      Assessment and Plan:  -Hematuria:  Holding ASA and Meloxicam.  Continue CBI.  Will check hemoglobin @ 2PM.  Maintain felxi catheter.  Urology f/u  -Acute blood loss anemia 2/2 hematuria: Decline in hemoglobin from 9.4-->8.6 today.  Will repeat Hbg@2PM  -Prostate CA:  metastatic to bone.  s/p seed inplant.  On Extandi and Lupron.  Pt. to follow up with his urologist Dr. Can El on 2/14.  Will need medical oncology evaluation as outpatient with Dr. Mike Abbott in [place], [place].  -BPH:  continue Flomax 0.8mg PO QHS  -HTN:  Continue Metoprolol 50mg PO BID  -HLD:  continue Lipitor 80mg PO QHS  -Type 2 DM:  Continue humalog sliding scale  -VTE ppx: SCD

## 2019-02-10 PROCEDURE — 99232 SBSQ HOSP IP/OBS MODERATE 35: CPT

## 2019-02-10 RX ORDER — LACTULOSE 10 G/15ML
20 SOLUTION ORAL ONCE
Qty: 0 | Refills: 0 | Status: COMPLETED | OUTPATIENT
Start: 2019-02-10 | End: 2019-02-10

## 2019-02-10 RX ORDER — SENNA PLUS 8.6 MG/1
2 TABLET ORAL AT BEDTIME
Qty: 0 | Refills: 0 | Status: DISCONTINUED | OUTPATIENT
Start: 2019-02-10 | End: 2019-02-13

## 2019-02-10 RX ORDER — POLYETHYLENE GLYCOL 3350 17 G/17G
17 POWDER, FOR SOLUTION ORAL DAILY
Qty: 0 | Refills: 0 | Status: DISCONTINUED | OUTPATIENT
Start: 2019-02-10 | End: 2019-02-13

## 2019-02-10 RX ADMIN — Medication 50 MILLIGRAM(S): at 06:04

## 2019-02-10 RX ADMIN — Medication 1: at 08:19

## 2019-02-10 RX ADMIN — TAMSULOSIN HYDROCHLORIDE 0.8 MILLIGRAM(S): 0.4 CAPSULE ORAL at 22:01

## 2019-02-10 RX ADMIN — SENNA PLUS 2 TABLET(S): 8.6 TABLET ORAL at 22:01

## 2019-02-10 RX ADMIN — Medication 50 MILLIGRAM(S): at 18:03

## 2019-02-10 RX ADMIN — ATORVASTATIN CALCIUM 80 MILLIGRAM(S): 80 TABLET, FILM COATED ORAL at 22:01

## 2019-02-10 RX ADMIN — Medication 650 MILLIGRAM(S): at 08:21

## 2019-02-10 RX ADMIN — Medication 1: at 18:03

## 2019-02-10 RX ADMIN — Medication 100 MILLIGRAM(S): at 06:04

## 2019-02-10 RX ADMIN — Medication 2: at 12:12

## 2019-02-10 RX ADMIN — POLYETHYLENE GLYCOL 3350 17 GRAM(S): 17 POWDER, FOR SOLUTION ORAL at 12:12

## 2019-02-10 RX ADMIN — LACTULOSE 20 GRAM(S): 10 SOLUTION ORAL at 12:12

## 2019-02-10 RX ADMIN — Medication 100 MILLIGRAM(S): at 22:01

## 2019-02-10 NOTE — PROGRESS NOTE ADULT - SUBJECTIVE AND OBJECTIVE BOX
CHIEF COMPLAINT/INTERVAL HISTORY:  Pt. seen and evaluated for hematuria and anemia.  Pt. is in no distress.  Reports having constipation.      REVIEW OF SYSTEMS:  No fever, CP, SOB, or abdominal pain.     Vital Signs Last 24 Hrs  T(C): 36.8 (10 Feb 2019 07:17), Max: 37.1 (09 Feb 2019 16:16)  T(F): 98.2 (10 Feb 2019 07:17), Max: 98.7 (09 Feb 2019 16:16)  HR: 61 (10 Feb 2019 07:17) (61 - 82)  BP: 119/70 (10 Feb 2019 07:17) (119/70 - 164/82)  BP(mean): --  RR: 18 (10 Feb 2019 07:17) (17 - 18)  SpO2: 96% (10 Feb 2019 07:17) (95% - 99%)    PHYSICAL EXAM:  GENERAL: NAD  HEENT: EOMI, hearing normal, conjunctiva and sclera clear  Chest: CTA bilaterally, no wheezing  CV: S1S2, RRR,   GI: soft, +BS, NT/ND  : +felix catheter with hematuria (light red)  Musculoskeletal: 1+ LE edema  Psychiatric: affect nL, mood nL  Skin: warm and dry    LABS:                        8.8    10.63 )-----------( 273      ( 10 Feb 2019 08:15 )             28.0     02-10    140  |  106  |  18  ----------------------------<  186<H>  4.5   |  28  |  0.82    Ca    8.6      10 Feb 2019 08:15      Assessment and Plan:  -Hematuria:  Holding ASA and Meloxicam.  Continue CBI.  Maintain felix catheter.  Urology f/u  -Acute blood loss anemia 2/2 hematuria:  Check iron studies, folate, and B12 levels.  -Prostate CA:  metastatic to bone.  s/p seed inplant.  On Extandi and Lupron.  Pt. to follow up with his urologist Dr. Can El on 2/14.  Will need medical oncology evaluation as outpatient with Dr. Mike Abbott in [place], [place].  -BPH:  continue Flomax 0.8mg PO QHS  -HTN:  Continue Metoprolol 50mg PO BID  -HLD:  continue Lipitor 80mg PO QHS  -Type 2 DM:  Continue humalog sliding scale  -Constipation:  Continue Colace 100mg PO TID.  Start senna 2tabs PO QHS, Miralax 17gm PO daily PRN, and lactulose 20gm PO once.   -VTE ppx: SCD

## 2019-02-10 NOTE — PROGRESS NOTE ADULT - SUBJECTIVE AND OBJECTIVE BOX
Coverage for Dr. Cedeño  INTERVAL Hx:  No acute events overnight.  Finally just had a large BM.  Gross hematuria started again after BM.  Pt with metastatic CaP, on Lupron and Xtandi; sees Dr. Can El.    MEDICATIONS  (STANDING):  atorvastatin 80 milliGRAM(s) Oral at bedtime  dextrose 5%. 1000 milliLiter(s) (50 mL/Hr) IV Continuous <Continuous>  dextrose 50% Injectable 12.5 Gram(s) IV Push once  dextrose 50% Injectable 25 Gram(s) IV Push once  dextrose 50% Injectable 25 Gram(s) IV Push once  docusate sodium 100 milliGRAM(s) Oral three times a day  insulin lispro (HumaLOG) corrective regimen sliding scale   SubCutaneous three times a day before meals  insulin lispro (HumaLOG) corrective regimen sliding scale   SubCutaneous at bedtime  metoprolol tartrate 50 milliGRAM(s) Oral two times a day  senna 2 Tablet(s) Oral at bedtime  tamsulosin 0.8 milliGRAM(s) Oral at bedtime    MEDICATIONS  (PRN):  acetaminophen   Tablet .. 650 milliGRAM(s) Oral every 6 hours PRN Severe Pain (7 - 10)  dextrose 40% Gel 15 Gram(s) Oral once PRN Blood Glucose LESS THAN 70 milliGRAM(s)/deciliter  glucagon  Injectable 1 milliGRAM(s) IntraMuscular once PRN Glucose LESS THAN 70 milligrams/deciliter  polyethylene glycol 3350 17 Gram(s) Oral daily PRN Constipation        Vital Signs Last 24 Hrs  T(C): 36.8 (10 Feb 2019 07:17), Max: 37.1 (09 Feb 2019 16:16)  T(F): 98.2 (10 Feb 2019 07:17), Max: 98.7 (09 Feb 2019 16:16)  HR: 61 (10 Feb 2019 07:17) (61 - 82)  BP: 119/70 (10 Feb 2019 07:17) (119/70 - 164/82)  BP(mean): --  RR: 18 (10 Feb 2019 07:17) (17 - 18)  SpO2: 96% (10 Feb 2019 07:17) (95% - 99%)    PHYSICAL EXAM:    ABDOMEN: soft, NT, no CVAT    Timmons: CBI in progress.  (+)hematuria    LABS:                        8.8    10.63 )-----------( 273      ( 10 Feb 2019 08:15 )             28.0     02-10    140  |  106  |  18  ----------------------------<  186<H>  4.5   |  28  |  0.82    Ca    8.6      10 Feb 2019 08:15      Urine culture:  02-07 @ 01:13 --   >100,000 CFU/ml Klebsiella pneumoniae      Review of Systems:    CONSTITUTIONAL: No weakness, fevers or chills    SKIN: No itching, burning, rashes, or lesions     EYES/ENT: No visual changes;  No vertigo or throat pain     NECK: No pain or stiffness    RESPIRATORY: No cough, wheezing, hemoptysis; No shortness of breath    CARDIOVASCULAR: No chest pain or palpitations    GASTROINTESTINAL: No abdominal or epigastric pain. No nausea, vomiting, diarrhea    NEUROLOGICAL: No numbness or weakness

## 2019-02-11 LAB
ANION GAP SERPL CALC-SCNC: 7 MMOL/L — SIGNIFICANT CHANGE UP (ref 5–17)
BUN SERPL-MCNC: 23 MG/DL — SIGNIFICANT CHANGE UP (ref 7–23)
CALCIUM SERPL-MCNC: 8.6 MG/DL — SIGNIFICANT CHANGE UP (ref 8.5–10.1)
CHLORIDE SERPL-SCNC: 108 MMOL/L — SIGNIFICANT CHANGE UP (ref 96–108)
CO2 SERPL-SCNC: 28 MMOL/L — SIGNIFICANT CHANGE UP (ref 22–31)
CREAT SERPL-MCNC: 0.9 MG/DL — SIGNIFICANT CHANGE UP (ref 0.5–1.3)
FERRITIN SERPL-MCNC: 192 NG/ML — SIGNIFICANT CHANGE UP (ref 30–400)
FOLATE SERPL-MCNC: >20 NG/ML — SIGNIFICANT CHANGE UP
GLUCOSE SERPL-MCNC: 191 MG/DL — HIGH (ref 70–99)
HCT VFR BLD CALC: 25.6 % — LOW (ref 39–50)
HCT VFR BLD CALC: 25.9 % — LOW (ref 39–50)
HGB BLD-MCNC: 8.3 G/DL — LOW (ref 13–17)
HGB BLD-MCNC: 8.3 G/DL — LOW (ref 13–17)
IRON SATN MFR SERPL: 20 UG/DL — LOW (ref 45–165)
IRON SATN MFR SERPL: 8 % — LOW (ref 16–55)
MAGNESIUM SERPL-MCNC: 1.7 MG/DL — SIGNIFICANT CHANGE UP (ref 1.6–2.6)
MCHC RBC-ENTMCNC: 29 PG — SIGNIFICANT CHANGE UP (ref 27–34)
MCHC RBC-ENTMCNC: 32.4 GM/DL — SIGNIFICANT CHANGE UP (ref 32–36)
MCV RBC AUTO: 89.5 FL — SIGNIFICANT CHANGE UP (ref 80–100)
NRBC # BLD: 0 /100 WBCS — SIGNIFICANT CHANGE UP (ref 0–0)
PLATELET # BLD AUTO: 272 K/UL — SIGNIFICANT CHANGE UP (ref 150–400)
POTASSIUM SERPL-MCNC: 4.6 MMOL/L — SIGNIFICANT CHANGE UP (ref 3.5–5.3)
POTASSIUM SERPL-SCNC: 4.6 MMOL/L — SIGNIFICANT CHANGE UP (ref 3.5–5.3)
RBC # BLD: 2.86 M/UL — LOW (ref 4.2–5.8)
RBC # FLD: 13.3 % — SIGNIFICANT CHANGE UP (ref 10.3–14.5)
SODIUM SERPL-SCNC: 143 MMOL/L — SIGNIFICANT CHANGE UP (ref 135–145)
TIBC SERPL-MCNC: 248 UG/DL — SIGNIFICANT CHANGE UP (ref 220–430)
UIBC SERPL-MCNC: 228 UG/DL — SIGNIFICANT CHANGE UP (ref 110–370)
VIT B12 SERPL-MCNC: <150 PG/ML — LOW (ref 232–1245)
WBC # BLD: 8.27 K/UL — SIGNIFICANT CHANGE UP (ref 3.8–10.5)
WBC # FLD AUTO: 8.27 K/UL — SIGNIFICANT CHANGE UP (ref 3.8–10.5)

## 2019-02-11 PROCEDURE — 99232 SBSQ HOSP IP/OBS MODERATE 35: CPT

## 2019-02-11 RX ORDER — FERROUS SULFATE 325(65) MG
325 TABLET ORAL
Qty: 0 | Refills: 0 | Status: DISCONTINUED | OUTPATIENT
Start: 2019-02-11 | End: 2019-02-13

## 2019-02-11 RX ORDER — PREGABALIN 225 MG/1
1000 CAPSULE ORAL DAILY
Qty: 0 | Refills: 0 | Status: DISCONTINUED | OUTPATIENT
Start: 2019-02-11 | End: 2019-02-13

## 2019-02-11 RX ADMIN — Medication 325 MILLIGRAM(S): at 17:54

## 2019-02-11 RX ADMIN — ATORVASTATIN CALCIUM 80 MILLIGRAM(S): 80 TABLET, FILM COATED ORAL at 21:15

## 2019-02-11 RX ADMIN — TAMSULOSIN HYDROCHLORIDE 0.8 MILLIGRAM(S): 0.4 CAPSULE ORAL at 21:15

## 2019-02-11 RX ADMIN — Medication 100 MILLIGRAM(S): at 21:15

## 2019-02-11 RX ADMIN — Medication 1: at 12:14

## 2019-02-11 RX ADMIN — Medication 1: at 08:11

## 2019-02-11 RX ADMIN — SENNA PLUS 2 TABLET(S): 8.6 TABLET ORAL at 21:15

## 2019-02-11 RX ADMIN — Medication 50 MILLIGRAM(S): at 05:50

## 2019-02-11 RX ADMIN — Medication 100 MILLIGRAM(S): at 05:50

## 2019-02-11 RX ADMIN — Medication 100 MILLIGRAM(S): at 15:28

## 2019-02-11 NOTE — PROGRESS NOTE ADULT - SUBJECTIVE AND OBJECTIVE BOX
PAST MEDICAL & SURGICAL HISTORY:  Prostate cancer: with seed implant and on Xtandi  HLD (hyperlipidemia)  BPH (benign prostatic hypertrophy): with seed implant  DMII (diabetes mellitus, type 2)  HTN (hypertension)  Polyp of colon  Bilateral cataracts      REVIEW OF SYSTEMS:    MEDICATIONS  (STANDING):  atorvastatin 80 milliGRAM(s) Oral at bedtime  dextrose 5%. 1000 milliLiter(s) (50 mL/Hr) IV Continuous <Continuous>  dextrose 50% Injectable 12.5 Gram(s) IV Push once  dextrose 50% Injectable 25 Gram(s) IV Push once  dextrose 50% Injectable 25 Gram(s) IV Push once  docusate sodium 100 milliGRAM(s) Oral three times a day  insulin lispro (HumaLOG) corrective regimen sliding scale   SubCutaneous three times a day before meals  insulin lispro (HumaLOG) corrective regimen sliding scale   SubCutaneous at bedtime  metoprolol tartrate 50 milliGRAM(s) Oral two times a day  senna 2 Tablet(s) Oral at bedtime  tamsulosin 0.8 milliGRAM(s) Oral at bedtime    MEDICATIONS  (PRN):  acetaminophen   Tablet .. 650 milliGRAM(s) Oral every 6 hours PRN Severe Pain (7 - 10)  dextrose 40% Gel 15 Gram(s) Oral once PRN Blood Glucose LESS THAN 70 milliGRAM(s)/deciliter  glucagon  Injectable 1 milliGRAM(s) IntraMuscular once PRN Glucose LESS THAN 70 milligrams/deciliter  polyethylene glycol 3350 17 Gram(s) Oral daily PRN Constipation      PE: CBI brown to clear    Allergies    No Known Allergies    Intolerances        FAMILY HISTORY:  Family history of MI (myocardial infarction) (Father)      Vital Signs Last 24 Hrs  T(C): 36.7 (11 Feb 2019 07:32), Max: 37 (10 Feb 2019 23:30)  T(F): 98 (11 Feb 2019 07:32), Max: 98.6 (10 Feb 2019 23:30)  HR: 65 (11 Feb 2019 07:32) (65 - 78)  BP: 143/71 (11 Feb 2019 07:32) (99/61 - 159/64)  BP(mean): --  RR: 18 (11 Feb 2019 07:32) (17 - 18)  SpO2: 98% (11 Feb 2019 07:32) (98% - 99%)      LABS:                        8.3    8.27  )-----------( 272      ( 11 Feb 2019 05:56 )             25.6     02-11    143  |  108  |  23  ----------------------------<  191<H>  4.6   |  28  |  0.90    Ca    8.6      11 Feb 2019 05:56  Mg     1.7     02-11          Urine Culture:     RADIOLOGY & ADDITIONAL STUDIES:

## 2019-02-11 NOTE — PROGRESS NOTE ADULT - ASSESSMENT
CHIEF COMPLAINT:    HISTORY OF PRESENT ILLNESS:    PAST MEDICAL & SURGICAL HISTORY:  Prostate cancer: with seed implant and on Xtandi  HLD (hyperlipidemia)  BPH (benign prostatic hypertrophy): with seed implant  DMII (diabetes mellitus, type 2)  HTN (hypertension)  Polyp of colon  Bilateral cataracts      REVIEW OF SYSTEMS:    MEDICATIONS  (STANDING):  atorvastatin 80 milliGRAM(s) Oral at bedtime  dextrose 5%. 1000 milliLiter(s) (50 mL/Hr) IV Continuous <Continuous>  dextrose 50% Injectable 12.5 Gram(s) IV Push once  dextrose 50% Injectable 25 Gram(s) IV Push once  dextrose 50% Injectable 25 Gram(s) IV Push once  docusate sodium 100 milliGRAM(s) Oral three times a day  insulin lispro (HumaLOG) corrective regimen sliding scale   SubCutaneous three times a day before meals  insulin lispro (HumaLOG) corrective regimen sliding scale   SubCutaneous at bedtime  metoprolol tartrate 50 milliGRAM(s) Oral two times a day  senna 2 Tablet(s) Oral at bedtime  tamsulosin 0.8 milliGRAM(s) Oral at bedtime    MEDICATIONS  (PRN):  acetaminophen   Tablet .. 650 milliGRAM(s) Oral every 6 hours PRN Severe Pain (7 - 10)  dextrose 40% Gel 15 Gram(s) Oral once PRN Blood Glucose LESS THAN 70 milliGRAM(s)/deciliter  glucagon  Injectable 1 milliGRAM(s) IntraMuscular once PRN Glucose LESS THAN 70 milligrams/deciliter  polyethylene glycol 3350 17 Gram(s) Oral daily PRN Constipation      PE:    Allergies    No Known Allergies    Intolerances        FAMILY HISTORY:  Family history of MI (myocardial infarction) (Father)      Vital Signs Last 24 Hrs  T(C): 36.7 (11 Feb 2019 07:32), Max: 37 (10 Feb 2019 23:30)  T(F): 98 (11 Feb 2019 07:32), Max: 98.6 (10 Feb 2019 23:30)  HR: 65 (11 Feb 2019 07:32) (65 - 78)  BP: 143/71 (11 Feb 2019 07:32) (99/61 - 159/64)  BP(mean): --  RR: 18 (11 Feb 2019 07:32) (17 - 18)  SpO2: 98% (11 Feb 2019 07:32) (98% - 99%)    PHYSICAL EXAM:    LABS:                        8.3    8.27  )-----------( 272      ( 11 Feb 2019 05:56 )             25.6     02-11    143  |  108  |  23  ----------------------------<  191<H>  4.6   |  28  |  0.90    Ca    8.6      11 Feb 2019 05:56  Mg     1.7     02-11          Urine Culture:             RADIOLOGY & ADDITIONAL STUDIES:      For TOV today, labs noted

## 2019-02-11 NOTE — PROGRESS NOTE ADULT - SUBJECTIVE AND OBJECTIVE BOX
CHIEF COMPLAINT/INTERVAL HISTORY:  Pt. seen and evaluated for hematuria.  Pt. reported having suprapubic pain last night and needed irrigation of felix catheter to clear clots.  Pt. is in no distress this AM.  Still with hematuria.      REVIEW OF SYSTEMS:  No fever, CP, SOB, or abdominal pain.     Vital Signs Last 24 Hrs  T(C): 36.7 (11 Feb 2019 07:32), Max: 37 (10 Feb 2019 23:30)  T(F): 98 (11 Feb 2019 07:32), Max: 98.6 (10 Feb 2019 23:30)  HR: 65 (11 Feb 2019 07:32) (65 - 78)  BP: 143/71 (11 Feb 2019 07:32) (99/61 - 159/64)  BP(mean): --  RR: 18 (11 Feb 2019 07:32) (17 - 18)  SpO2: 98% (11 Feb 2019 07:32) (98% - 99%)    PHYSICAL EXAM:  GENERAL: NAD  HEENT: EOMI, hearing normal, conjunctiva and sclera clear  Chest: CTA bilaterally, no wheezing  CV: S1S2, RRR,   GI: soft, +BS, NT/ND  : +felix catheter with hematuria  Musculoskeletal: 1+ LE edema  Psychiatric: affect nL, mood nL  Skin: warm and dry    LABS:                        8.3    8.27  )-----------( 272      ( 11 Feb 2019 05:56 )             25.6     02-11    143  |  108  |  23  ----------------------------<  191<H>  4.6   |  28  |  0.90    Ca    8.6      11 Feb 2019 05:56  Mg     1.7     02-11      Assessment and Plan:  -Hematuria:  Holding ASA and Meloxicam.  Continue CBI.  Maintain felix catheter.  Urology f/u  -Acute blood loss anemia 2/2 hematuria:  Check iron studies, folate, and B12 levels.  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  s/p seed inplant.  On Extandi and Lupron.  Pt. to follow up with his urologist Dr. Can El on 2/14.  Will need medical oncology evaluation as outpatient with Dr. Mike Abbott in [place], [place].  -BPH:  continue Flomax 0.8mg PO QHS  -HTN:  Continue Metoprolol 50mg PO BID  -HLD:  continue Lipitor 80mg PO QHS  -Type 2 DM:  Continue humalog sliding scale  -Constipation:  Continue Colace 100mg PO TID, senna 2tabs PO QHS, and Miralax 17gm PO daily PRN.  -VTE ppx: SCD

## 2019-02-12 LAB
ANION GAP SERPL CALC-SCNC: 6 MMOL/L — SIGNIFICANT CHANGE UP (ref 5–17)
BUN SERPL-MCNC: 17 MG/DL — SIGNIFICANT CHANGE UP (ref 7–23)
CALCIUM SERPL-MCNC: 8.6 MG/DL — SIGNIFICANT CHANGE UP (ref 8.5–10.1)
CHLORIDE SERPL-SCNC: 108 MMOL/L — SIGNIFICANT CHANGE UP (ref 96–108)
CO2 SERPL-SCNC: 28 MMOL/L — SIGNIFICANT CHANGE UP (ref 22–31)
CREAT SERPL-MCNC: 0.83 MG/DL — SIGNIFICANT CHANGE UP (ref 0.5–1.3)
GLUCOSE SERPL-MCNC: 174 MG/DL — HIGH (ref 70–99)
HCT VFR BLD CALC: 26.2 % — LOW (ref 39–50)
HGB BLD-MCNC: 8.3 G/DL — LOW (ref 13–17)
MCHC RBC-ENTMCNC: 28.6 PG — SIGNIFICANT CHANGE UP (ref 27–34)
MCHC RBC-ENTMCNC: 31.7 GM/DL — LOW (ref 32–36)
MCV RBC AUTO: 90.3 FL — SIGNIFICANT CHANGE UP (ref 80–100)
NRBC # BLD: 0 /100 WBCS — SIGNIFICANT CHANGE UP (ref 0–0)
PLATELET # BLD AUTO: 304 K/UL — SIGNIFICANT CHANGE UP (ref 150–400)
POTASSIUM SERPL-MCNC: 4.5 MMOL/L — SIGNIFICANT CHANGE UP (ref 3.5–5.3)
POTASSIUM SERPL-SCNC: 4.5 MMOL/L — SIGNIFICANT CHANGE UP (ref 3.5–5.3)
RBC # BLD: 2.9 M/UL — LOW (ref 4.2–5.8)
RBC # FLD: 13.2 % — SIGNIFICANT CHANGE UP (ref 10.3–14.5)
SODIUM SERPL-SCNC: 142 MMOL/L — SIGNIFICANT CHANGE UP (ref 135–145)
WBC # BLD: 7.46 K/UL — SIGNIFICANT CHANGE UP (ref 3.8–10.5)
WBC # FLD AUTO: 7.46 K/UL — SIGNIFICANT CHANGE UP (ref 3.8–10.5)

## 2019-02-12 PROCEDURE — 99232 SBSQ HOSP IP/OBS MODERATE 35: CPT

## 2019-02-12 RX ORDER — PREGABALIN 225 MG/1
1 CAPSULE ORAL
Qty: 30 | Refills: 0 | OUTPATIENT
Start: 2019-02-12

## 2019-02-12 RX ORDER — DOCUSATE SODIUM 100 MG
1 CAPSULE ORAL
Qty: 0 | Refills: 0 | COMMUNITY
Start: 2019-02-12

## 2019-02-12 RX ORDER — SENNA PLUS 8.6 MG/1
2 TABLET ORAL
Qty: 0 | Refills: 0 | COMMUNITY
Start: 2019-02-12

## 2019-02-12 RX ORDER — CIPROFLOXACIN LACTATE 400MG/40ML
500 VIAL (ML) INTRAVENOUS EVERY 12 HOURS
Qty: 0 | Refills: 0 | Status: DISCONTINUED | OUTPATIENT
Start: 2019-02-12 | End: 2019-02-13

## 2019-02-12 RX ORDER — FERROUS SULFATE 325(65) MG
1 TABLET ORAL
Qty: 60 | Refills: 0 | OUTPATIENT
Start: 2019-02-12

## 2019-02-12 RX ORDER — POLYETHYLENE GLYCOL 3350 17 G/17G
17 POWDER, FOR SOLUTION ORAL
Qty: 0 | Refills: 0 | COMMUNITY
Start: 2019-02-12

## 2019-02-12 RX ADMIN — SENNA PLUS 2 TABLET(S): 8.6 TABLET ORAL at 21:28

## 2019-02-12 RX ADMIN — Medication 100 MILLIGRAM(S): at 15:16

## 2019-02-12 RX ADMIN — Medication 100 MILLIGRAM(S): at 05:50

## 2019-02-12 RX ADMIN — Medication 50 MILLIGRAM(S): at 18:41

## 2019-02-12 RX ADMIN — PREGABALIN 1000 MICROGRAM(S): 225 CAPSULE ORAL at 12:52

## 2019-02-12 RX ADMIN — Medication 500 MILLIGRAM(S): at 18:41

## 2019-02-12 RX ADMIN — Medication 2: at 11:33

## 2019-02-12 RX ADMIN — Medication 325 MILLIGRAM(S): at 05:50

## 2019-02-12 RX ADMIN — TAMSULOSIN HYDROCHLORIDE 0.8 MILLIGRAM(S): 0.4 CAPSULE ORAL at 21:28

## 2019-02-12 RX ADMIN — ATORVASTATIN CALCIUM 80 MILLIGRAM(S): 80 TABLET, FILM COATED ORAL at 21:28

## 2019-02-12 RX ADMIN — Medication 1: at 08:03

## 2019-02-12 RX ADMIN — Medication 100 MILLIGRAM(S): at 21:28

## 2019-02-12 NOTE — PROGRESS NOTE ADULT - SUBJECTIVE AND OBJECTIVE BOX
INTERVAL HPI/OVERNIGHT EVENTS: Timmons removed several minutes ago.    MEDICATIONS  (STANDING):  atorvastatin 80 milliGRAM(s) Oral at bedtime  cyanocobalamin Injectable 1000 MICROGram(s) SubCutaneous daily  dextrose 5%. 1000 milliLiter(s) (50 mL/Hr) IV Continuous <Continuous>  dextrose 50% Injectable 12.5 Gram(s) IV Push once  dextrose 50% Injectable 25 Gram(s) IV Push once  dextrose 50% Injectable 25 Gram(s) IV Push once  docusate sodium 100 milliGRAM(s) Oral three times a day  ferrous    sulfate 325 milliGRAM(s) Oral two times a day  insulin lispro (HumaLOG) corrective regimen sliding scale   SubCutaneous three times a day before meals  insulin lispro (HumaLOG) corrective regimen sliding scale   SubCutaneous at bedtime  metoprolol tartrate 50 milliGRAM(s) Oral two times a day  senna 2 Tablet(s) Oral at bedtime  tamsulosin 0.8 milliGRAM(s) Oral at bedtime    MEDICATIONS  (PRN):  acetaminophen   Tablet .. 650 milliGRAM(s) Oral every 6 hours PRN Severe Pain (7 - 10)  dextrose 40% Gel 15 Gram(s) Oral once PRN Blood Glucose LESS THAN 70 milliGRAM(s)/deciliter  glucagon  Injectable 1 milliGRAM(s) IntraMuscular once PRN Glucose LESS THAN 70 milligrams/deciliter  polyethylene glycol 3350 17 Gram(s) Oral daily PRN Constipation        Vital Signs Last 24 Hrs  T(C): 37.4 (12 Feb 2019 04:14), Max: 37.4 (12 Feb 2019 04:14)  T(F): 99.4 (12 Feb 2019 04:14), Max: 99.4 (12 Feb 2019 04:14)  HR: 75 (12 Feb 2019 04:14) (65 - 87)  BP: 108/52 (12 Feb 2019 04:14) (108/52 - 154/72)  BP(mean): --  RR: 18 (12 Feb 2019 04:14) (17 - 18)  SpO2: 98% (12 Feb 2019 04:14) (98% - 100%)    PHYSICAL EXAM:    ABDOMEN: No SP tenderness or distention      LABS:                        8.3    7.46  )-----------( 304      ( 12 Feb 2019 06:32 )             26.2     02-12    142  |  108  |  17  ----------------------------<  174<H>  4.5   |  28  |  0.83    Ca    8.6      12 Feb 2019 06:32  Mg     1.7     02-11

## 2019-02-12 NOTE — PROGRESS NOTE ADULT - PROBLEM SELECTOR PROBLEM 1
Hematuria, gross
Prostate cancer metastatic to bone
Hematuria, unspecified type
Hematuria, unspecified type

## 2019-02-12 NOTE — PROGRESS NOTE ADULT - PROBLEM SELECTOR PLAN 2
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
Improving. CBI to run slowly. Irrigate Timmons as needed. Anticipate discharge with Timmons in 24-48 hours.
acute blood loss anemia due to hematuria; hgb 9.5 this morning  check hgb daily; s/p type & cross
acute blood loss anemia due to hematuria; hgb 9.5 this morning  check hgb daily; s/p type & cross  hgb remains stable today

## 2019-02-12 NOTE — PROGRESS NOTE ADULT - PROBLEM SELECTOR PLAN 1
Resolving. Timmons removed and voiding trial in progress.    Observe for next 24 hours. Anticipate discharge tomorrow with or without catheter.

## 2019-02-12 NOTE — PROGRESS NOTE ADULT - SUBJECTIVE AND OBJECTIVE BOX
CHIEF COMPLAINT/INTERVAL HISTORY:  Pt. seen and evaluated for hematuria.  Pt. is in no distress.  Timmons catheter removed earlier this morning.  Has not voided yet.  No supra-pubic discomfort.     REVIEW OF SYSTEMS:  No fever, CP, SOB, or abdominal pain.     Vital Signs Last 24 Hrs  T(C): 36.7 (12 Feb 2019 08:03), Max: 37.4 (12 Feb 2019 04:14)  T(F): 98 (12 Feb 2019 08:03), Max: 99.4 (12 Feb 2019 04:14)  HR: 70 (12 Feb 2019 08:03) (68 - 87)  BP: 139/66 (12 Feb 2019 08:03) (108/52 - 154/72)  BP(mean): --  RR: 18 (12 Feb 2019 08:03) (17 - 18)  SpO2: 97% (12 Feb 2019 08:03) (97% - 100%)    PHYSICAL EXAM:  GENERAL: NAD  HEENT: EOMI, hearing normal, conjunctiva and sclera clear  Chest: CTA bilaterally, no wheezing  CV: S1S2, RRR,   GI: soft, +BS, NT/ND  Musculoskeletal: 1+ LE edema  Psychiatric: affect nL, mood nL  Skin: warm and dry    LABS:                        8.3    7.46  )-----------( 304      ( 12 Feb 2019 06:32 )             26.2     02-12    142  |  108  |  17  ----------------------------<  174<H>  4.5   |  28  |  0.83    Ca    8.6      12 Feb 2019 06:32  Mg     1.7     02-11      Assessment and Plan:  -Hematuria and urinary retention:  Holding ASA and Meloxicam. TOV today.  Urology f/u  -Acute blood loss anemia 2/2 hematuria:  Hbg stable.  Continue Ferrous sulfate 325mg PO BID and Vitamin B12 1000mcg SQ daily  -Prostate CA:  metastatic to bone.  s/p seed inplant.  On Extandi and Lupron.  Pt. to follow up with his urologist Dr. Can El on 2/14.  Will need medical oncology evaluation as outpatient with Dr. Mike Abbott in [place], [place].  -BPH:  continue Flomax 0.8mg PO QHS  -HTN:  Continue Metoprolol 50mg PO BID  -HLD:  continue Lipitor 80mg PO QHS  -Type 2 DM:  Continue humalog sliding scale  -Constipation:  Continue Colace 100mg PO TID, senna 2tabs PO QHS, and Miralax 17gm PO daily PRN.  -VTE ppx: SCD

## 2019-02-13 VITALS
TEMPERATURE: 98 F | SYSTOLIC BLOOD PRESSURE: 138 MMHG | OXYGEN SATURATION: 97 % | RESPIRATION RATE: 16 BRPM | DIASTOLIC BLOOD PRESSURE: 75 MMHG | HEART RATE: 87 BPM

## 2019-02-13 LAB
ANION GAP SERPL CALC-SCNC: 8 MMOL/L — SIGNIFICANT CHANGE UP (ref 5–17)
BUN SERPL-MCNC: 17 MG/DL — SIGNIFICANT CHANGE UP (ref 7–23)
CALCIUM SERPL-MCNC: 8.9 MG/DL — SIGNIFICANT CHANGE UP (ref 8.5–10.1)
CHLORIDE SERPL-SCNC: 105 MMOL/L — SIGNIFICANT CHANGE UP (ref 96–108)
CO2 SERPL-SCNC: 28 MMOL/L — SIGNIFICANT CHANGE UP (ref 22–31)
CREAT SERPL-MCNC: 0.88 MG/DL — SIGNIFICANT CHANGE UP (ref 0.5–1.3)
GLUCOSE SERPL-MCNC: 204 MG/DL — HIGH (ref 70–99)
HCT VFR BLD CALC: 27.1 % — LOW (ref 39–50)
HGB BLD-MCNC: 8.6 G/DL — LOW (ref 13–17)
MCHC RBC-ENTMCNC: 28.5 PG — SIGNIFICANT CHANGE UP (ref 27–34)
MCHC RBC-ENTMCNC: 31.7 GM/DL — LOW (ref 32–36)
MCV RBC AUTO: 89.7 FL — SIGNIFICANT CHANGE UP (ref 80–100)
NRBC # BLD: 0 /100 WBCS — SIGNIFICANT CHANGE UP (ref 0–0)
PLATELET # BLD AUTO: 339 K/UL — SIGNIFICANT CHANGE UP (ref 150–400)
POTASSIUM SERPL-MCNC: 4 MMOL/L — SIGNIFICANT CHANGE UP (ref 3.5–5.3)
POTASSIUM SERPL-SCNC: 4 MMOL/L — SIGNIFICANT CHANGE UP (ref 3.5–5.3)
RBC # BLD: 3.02 M/UL — LOW (ref 4.2–5.8)
RBC # FLD: 13 % — SIGNIFICANT CHANGE UP (ref 10.3–14.5)
SODIUM SERPL-SCNC: 141 MMOL/L — SIGNIFICANT CHANGE UP (ref 135–145)
WBC # BLD: 8.08 K/UL — SIGNIFICANT CHANGE UP (ref 3.8–10.5)
WBC # FLD AUTO: 8.08 K/UL — SIGNIFICANT CHANGE UP (ref 3.8–10.5)

## 2019-02-13 PROCEDURE — 87086 URINE CULTURE/COLONY COUNT: CPT

## 2019-02-13 PROCEDURE — 82607 VITAMIN B-12: CPT

## 2019-02-13 PROCEDURE — 97162 PT EVAL MOD COMPLEX 30 MIN: CPT

## 2019-02-13 PROCEDURE — 82746 ASSAY OF FOLIC ACID SERUM: CPT

## 2019-02-13 PROCEDURE — 83036 HEMOGLOBIN GLYCOSYLATED A1C: CPT

## 2019-02-13 PROCEDURE — 85018 HEMOGLOBIN: CPT

## 2019-02-13 PROCEDURE — 83550 IRON BINDING TEST: CPT

## 2019-02-13 PROCEDURE — 83735 ASSAY OF MAGNESIUM: CPT

## 2019-02-13 PROCEDURE — 82728 ASSAY OF FERRITIN: CPT

## 2019-02-13 PROCEDURE — 83540 ASSAY OF IRON: CPT

## 2019-02-13 PROCEDURE — 97530 THERAPEUTIC ACTIVITIES: CPT

## 2019-02-13 PROCEDURE — 99239 HOSP IP/OBS DSCHRG MGMT >30: CPT

## 2019-02-13 PROCEDURE — 85610 PROTHROMBIN TIME: CPT

## 2019-02-13 PROCEDURE — 93005 ELECTROCARDIOGRAM TRACING: CPT

## 2019-02-13 PROCEDURE — 85730 THROMBOPLASTIN TIME PARTIAL: CPT

## 2019-02-13 PROCEDURE — 85027 COMPLETE CBC AUTOMATED: CPT

## 2019-02-13 PROCEDURE — 81001 URINALYSIS AUTO W/SCOPE: CPT

## 2019-02-13 PROCEDURE — 36415 COLL VENOUS BLD VENIPUNCTURE: CPT

## 2019-02-13 PROCEDURE — 87186 SC STD MICRODIL/AGAR DIL: CPT

## 2019-02-13 PROCEDURE — 84100 ASSAY OF PHOSPHORUS: CPT

## 2019-02-13 PROCEDURE — 80053 COMPREHEN METABOLIC PANEL: CPT

## 2019-02-13 PROCEDURE — 80048 BASIC METABOLIC PNL TOTAL CA: CPT

## 2019-02-13 PROCEDURE — 86901 BLOOD TYPING SEROLOGIC RH(D): CPT

## 2019-02-13 PROCEDURE — 86900 BLOOD TYPING SEROLOGIC ABO: CPT

## 2019-02-13 PROCEDURE — 85014 HEMATOCRIT: CPT

## 2019-02-13 PROCEDURE — 82962 GLUCOSE BLOOD TEST: CPT

## 2019-02-13 PROCEDURE — 86850 RBC ANTIBODY SCREEN: CPT

## 2019-02-13 PROCEDURE — 97116 GAIT TRAINING THERAPY: CPT

## 2019-02-13 PROCEDURE — 99285 EMERGENCY DEPT VISIT HI MDM: CPT | Mod: 25

## 2019-02-13 RX ORDER — LACTULOSE 10 G/15ML
20 SOLUTION ORAL ONCE
Qty: 0 | Refills: 0 | Status: COMPLETED | OUTPATIENT
Start: 2019-02-13 | End: 2019-02-13

## 2019-02-13 RX ORDER — MELOXICAM 15 MG/1
1 TABLET ORAL
Qty: 0 | Refills: 0 | COMMUNITY

## 2019-02-13 RX ORDER — CIPROFLOXACIN LACTATE 400MG/40ML
1 VIAL (ML) INTRAVENOUS
Qty: 8 | Refills: 0 | OUTPATIENT
Start: 2019-02-13

## 2019-02-13 RX ADMIN — Medication 50 MILLIGRAM(S): at 05:30

## 2019-02-13 RX ADMIN — Medication 500 MILLIGRAM(S): at 05:30

## 2019-02-13 RX ADMIN — Medication 325 MILLIGRAM(S): at 05:30

## 2019-02-13 RX ADMIN — Medication 100 MILLIGRAM(S): at 05:30

## 2019-02-13 RX ADMIN — PREGABALIN 1000 MICROGRAM(S): 225 CAPSULE ORAL at 11:56

## 2019-02-13 RX ADMIN — Medication 2: at 11:59

## 2019-02-13 RX ADMIN — LACTULOSE 20 GRAM(S): 10 SOLUTION ORAL at 11:56

## 2019-02-13 RX ADMIN — POLYETHYLENE GLYCOL 3350 17 GRAM(S): 17 POWDER, FOR SOLUTION ORAL at 05:30

## 2019-02-13 RX ADMIN — Medication 2: at 08:25

## 2019-02-13 RX ADMIN — Medication 100 MILLIGRAM(S): at 13:25

## 2019-02-13 NOTE — PROGRESS NOTE ADULT - SUBJECTIVE AND OBJECTIVE BOX
PAST MEDICAL & SURGICAL HISTORY:  Prostate cancer: with seed implant and on Xtandi  HLD (hyperlipidemia)  BPH (benign prostatic hypertrophy): with seed implant  DMII (diabetes mellitus, type 2)  HTN (hypertension)  Polyp of colon  Bilateral cataracts      REVIEW OF SYSTEMS:    MEDICATIONS  (STANDING):  atorvastatin 80 milliGRAM(s) Oral at bedtime  ciprofloxacin     Tablet 500 milliGRAM(s) Oral every 12 hours  cyanocobalamin Injectable 1000 MICROGram(s) SubCutaneous daily  dextrose 5%. 1000 milliLiter(s) (50 mL/Hr) IV Continuous <Continuous>  dextrose 50% Injectable 12.5 Gram(s) IV Push once  dextrose 50% Injectable 25 Gram(s) IV Push once  dextrose 50% Injectable 25 Gram(s) IV Push once  docusate sodium 100 milliGRAM(s) Oral three times a day  ferrous    sulfate 325 milliGRAM(s) Oral two times a day  insulin lispro (HumaLOG) corrective regimen sliding scale   SubCutaneous three times a day before meals  insulin lispro (HumaLOG) corrective regimen sliding scale   SubCutaneous at bedtime  metoprolol tartrate 50 milliGRAM(s) Oral two times a day  senna 2 Tablet(s) Oral at bedtime  tamsulosin 0.8 milliGRAM(s) Oral at bedtime    MEDICATIONS  (PRN):  acetaminophen   Tablet .. 650 milliGRAM(s) Oral every 6 hours PRN Severe Pain (7 - 10)  dextrose 40% Gel 15 Gram(s) Oral once PRN Blood Glucose LESS THAN 70 milliGRAM(s)/deciliter  glucagon  Injectable 1 milliGRAM(s) IntraMuscular once PRN Glucose LESS THAN 70 milligrams/deciliter  polyethylene glycol 3350 17 Gram(s) Oral daily PRN Constipation        Allergies    No Known Allergies    Intolerances        FAMILY HISTORY:  Family history of MI (myocardial infarction) (Father)      Vital Signs Last 24 Hrs  T(C): 37 (13 Feb 2019 08:35), Max: 37 (13 Feb 2019 08:35)  T(F): 98.6 (13 Feb 2019 08:35), Max: 98.6 (13 Feb 2019 08:35)  HR: 69 (13 Feb 2019 08:35) (66 - 77)  BP: 102/59 (13 Feb 2019 08:35) (99/52 - 156/71)  BP(mean): --  RR: 17 (13 Feb 2019 08:35) (17 - 18)  SpO2: 100% (13 Feb 2019 08:35) (96% - 100%)        LABS:                        8.6    8.08  )-----------( 339      ( 13 Feb 2019 06:40 )             27.1     02-13    141  |  105  |  17  ----------------------------<  204<H>  4.0   |  28  |  0.88    Ca    8.9      13 Feb 2019 06:40          Urine Culture:     RADIOLOGY & ADDITIONAL STUDIES:

## 2019-02-13 NOTE — PROGRESS NOTE ADULT - ASSESSMENT
Pt failed TOV yesterday, urine bloody but draining well and no clots.  Pt has constipation issues again.  Rec discharge pt home with felix, for f/u with Dr. El, his usual urologist.    Please re-consult if needed.    Thank you.

## 2019-02-13 NOTE — DIETITIAN INITIAL EVALUATION ADULT. - OTHER INFO
patient on phone but states PO good . states wife monitors diet . limited history obtained at this time metformin at home. . patient seen LOS 7 days

## 2019-02-13 NOTE — PROGRESS NOTE ADULT - SUBJECTIVE AND OBJECTIVE BOX
CHIEF COMPLAINT/INTERVAL HISTORY:  Pt. seen and evaluated for hematuria.  Pt. is in no distress.  Still having hematuria but hbg has been stable.  Pt. reports having constipation.      REVIEW OF SYSTEMS:  No fever, CP, SOB,  or abdominal pain.     Vital Signs Last 24 Hrs  T(C): 37 (13 Feb 2019 08:35), Max: 37 (13 Feb 2019 08:35)  T(F): 98.6 (13 Feb 2019 08:35), Max: 98.6 (13 Feb 2019 08:35)  HR: 69 (13 Feb 2019 08:35) (66 - 77)  BP: 102/59 (13 Feb 2019 08:35) (99/52 - 156/71)  BP(mean): --  RR: 17 (13 Feb 2019 08:35) (17 - 18)  SpO2: 100% (13 Feb 2019 08:35) (96% - 100%)    PHYSICAL EXAM:  GENERAL: NAD  HEENT: EOMI, hearing normal, conjunctiva and sclera clear  Chest: CTA bilaterally, no wheezing  CV: S1S2, RRR,   GI: soft, +BS, NT/ND  Musculoskeletal: 1+ LE edema  Psychiatric: affect nL, mood nL  Skin: warm and dry    LABS:                        8.6    8.08  )-----------( 339      ( 13 Feb 2019 06:40 )             27.1     02-13    141  |  105  |  17  ----------------------------<  204<H>  4.0   |  28  |  0.88    Ca    8.9      13 Feb 2019 06:40      Assessment and Plan:  -Hematuria and urinary retention:  Holding ASA and Meloxicam. Failed TOV yesterday.  Urology input appreciated.  Pt. has appointment with his urologist Dr. El tomorrow morning.    -Acute blood loss anemia 2/2 hematuria:  Hbg stable.  Continue Ferrous sulfate 325mg PO BID and Vitamin B12 1000mcg SQ daily  -Prostate CA:  metastatic to bone.  s/p seed inplant.  On Extandi and Lupron.  Pt. to follow up with his urologist Dr. Can El on 2/14.  Will need medical oncology evaluation as outpatient with Dr. Mike Abbott in [place], [place].  -BPH:  continue Flomax 0.8mg PO QHS  -HTN:  Continue Metoprolol 50mg PO BID  -HLD:  continue Lipitor 80mg PO QHS  -Type 2 DM:  Continue humalog sliding scale  -Constipation:  Continue Colace 100mg PO TID, senna 2tabs PO QHS, and Miralax 17gm PO daily PRN.  -VTE ppx: SCD

## 2020-05-14 NOTE — PATIENT PROFILE ADULT - NSPROMEDSADMININFO_GEN_A_NUR
no concerns

## 2024-11-18 NOTE — PATIENT PROFILE ADULT - NSPROHMONCCHEMOGIVEN_GEN_A_NUR
Subjective:       Patient ID: Karin Carrera is a 53 y.o. male.    Chief Complaint: Hypertension (2 month fu ), Health Maintenance (Pt will have A1c checked today ), and Abdominal Pain    HPI  .  Patient seen and evaluated he does complain of chronic nausea he wants the Zofran refilled also has tobacco dependency has a desire to quit smoking he also has several other chronic medical problems with a history of CVA.Due to the many adverse health risks of smoking tobacco,  Patient has been encouraged to quit smoking, and smoking sensation treatments have been   treatments have been offered and a  smoking cessation class has been recommended to the patient      Current Medications:    Current Outpatient Medications:     albuterol (PROVENTIL/VENTOLIN HFA) 90 mcg/actuation inhaler, SMARTSI-2 Puff(s) By Mouth Every 4 Hours PRN, Disp: 18 g, Rfl: 6    amiodarone (PACERONE) 200 MG Tab, Take 1 tablet (200 mg total) by mouth once daily., Disp: 30 tablet, Rfl: 11    amLODIPine (NORVASC) 10 MG tablet, Take 10 mg by mouth once daily., Disp: , Rfl:     aspirin (ECOTRIN) 81 MG EC tablet, Take 1 tablet (81 mg total) by mouth once daily., Disp: 90 tablet, Rfl: 1    atorvastatin (LIPITOR) 40 MG tablet, TAKE 1 TABLET BY MOUTH EVERY EVENING, Disp: 90 tablet, Rfl: 1    furosemide (LASIX) 40 MG tablet, TAKE ONE TABLET BY MOUTH DAILY AS NEEDED FOR SWELLING, Disp: 30 tablet, Rfl: 1    LEVEMIR FLEXPEN 100 unit/mL (3 mL) InPn pen, INJECT 10 UNITS SUB-Q EVERY EVENING KEEP REFRIGERATED, Disp: 36 mL, Rfl: 0    pantoprazole (PROTONIX) 40 MG tablet, Take 1 tablet (40 mg total) by mouth once daily., Disp: 90 tablet, Rfl: 1    potassium chloride (KLOR-CON) 10 MEQ TbSR, Take 10 mEq by mouth once daily., Disp: , Rfl:     sacubitriL-valsartan (ENTRESTO) 49-51 mg per tablet, Take 1 tablet by mouth 2 (two) times daily., Disp: 180 tablet, Rfl: 3    torsemide (DEMADEX) 20 MG Tab, Take 1 tablet (20 mg total) by mouth 2 (two) times a day., Disp: 60  tablet, Rfl: 11    traZODone (DESYREL) 50 MG tablet, Take 2 tablets (100 mg total) by mouth nightly as needed for Insomnia., Disp: 60 tablet, Rfl: 0    TRUE METRIX GLUCOSE TEST STRIP Strp, USE TO CHECK BLOOD SUGAR AS DIRECTED, Disp: 200 each, Rfl: 1    TRUEPLUS LANCETS 33 gauge Misc, 1 lancet  by Misc.(Non-Drug; Combo Route) route once daily. use as directed, Disp: 200 each, Rfl: 2    zolpidem (AMBIEN) 5 MG Tab, TAKE ONE TABLET BY MOUTH AT NIGHT AS NEEDED FOR INSOMNIA, Disp: 30 tablet, Rfl: 1    digoxin (LANOXIN) 125 mcg tablet, Take 1 tablet (0.125 mg total) by mouth once daily., Disp: 90 tablet, Rfl: 1    empagliflozin (JARDIANCE) 10 mg tablet, Take 1 tablet (10 mg total) by mouth once daily., Disp: 30 tablet, Rfl: 1    metoprolol succinate (TOPROL-XL) 25 MG 24 hr tablet, Take 0.5 tablets (12.5 mg total) by mouth once daily. Take 1/2 tablet by mouth daily, Disp: 30 tablet, Rfl: 1    nicotine (NICODERM CQ) 14 mg/24 hr, Place 1 patch onto the skin once daily., Disp: 90 patch, Rfl: 1    ondansetron (ZOFRAN) 4 MG tablet, Take 1 tablet (4 mg total) by mouth 3 (three) times daily as needed for Nausea., Disp: 30 tablet, Rfl: 3           ROS  Twelve point system reviewed, unremarkable except for stated above in HPI.        Objective:         Vitals:    11/18/24 0926   BP: 131/77   BP Location: Right arm   Patient Position: Sitting   Pulse: 105   Resp: 18   Temp: 97.4 °F (36.3 °C)   TempSrc: Tympanic   SpO2: 97%   Height: 6' (1.829 m)        Physical Exam     Patient is awake alert oriented person place and  Lungs are clear to auscultation bilaterally no crackles or wheezes   Cardiovascular S1-S2 regular rate and rhythm no murmurs rubs or gallops   Abdomen is soft positive bowel sounds nontender, extremities no clubbing cyanosis edema  Neuro no focal neurological deficits  Skin warm and dry.     Last Labs:     Admission on 11/09/2024, Discharged on 11/09/2024   Component Date Value    POC Glucose 11/09/2024 261 (H)      Sodium 11/09/2024 126 (L)     Potassium 11/09/2024 3.3 (L)     Chloride 11/09/2024 90 (L)     CO2 11/09/2024 30     Anion Gap 11/09/2024 9     Glucose 11/09/2024 148 (H)     BUN 11/09/2024 7     Creatinine 11/09/2024 1.00     BUN/Creatinine Ratio 11/09/2024 7     Calcium 11/09/2024 9.0     Total Protein 11/09/2024 7.8     Albumin 11/09/2024 3.9     Globulin 11/09/2024 3.9     A/G Ratio 11/09/2024 1.0     Bilirubin, Total 11/09/2024 0.9     Alk Phos 11/09/2024 128 (H)     ALT 11/09/2024 52     AST 11/09/2024 46 (H)     eGFR 11/09/2024 90     Lipase 11/09/2024 32     Color, UA 11/09/2024 Colorless     Clarity, UA 11/09/2024 Clear     pH, UA 11/09/2024 6.0     Leukocytes, UA 11/09/2024 Negative     Nitrites, UA 11/09/2024 Negative     Protein, UA 11/09/2024 Negative     Glucose, UA 11/09/2024 >1000 (A)     Ketones, UA 11/09/2024 Negative     Urobilinogen, UA 11/09/2024 Normal     Bilirubin, UA 11/09/2024 Negative     Blood, UA 11/09/2024 Negative     Specific Gravity, UA 11/09/2024 1.014     Lactic Acid 11/09/2024 3.0 (H)     WBC 11/09/2024 5.53     RBC 11/09/2024 5.51     Hemoglobin 11/09/2024 16.3     Hematocrit 11/09/2024 47.1     MCV 11/09/2024 85.5     MCH 11/09/2024 29.6     MCHC 11/09/2024 34.6     RDW 11/09/2024 13.5     Platelet Count 11/09/2024 225     MPV 11/09/2024 9.5     Neutrophils % 11/09/2024 75.8 (H)     Lymphocytes % 11/09/2024 10.5 (L)     Monocytes % 11/09/2024 12.1 (H)     Eosinophils % 11/09/2024 0.2 (L)     Basophils % 11/09/2024 0.7     Immature Granulocytes % 11/09/2024 0.7 (H)     nRBC, Auto 11/09/2024 0.0     Neutrophils, Abs 11/09/2024 4.19     Lymphocytes, Absolute 11/09/2024 0.58 (L)     Monocytes, Absolute 11/09/2024 0.67     Eosinophils, Absolute 11/09/2024 0.01     Basophils, Absolute 11/09/2024 0.04     Immature Granulocytes, A* 11/09/2024 0.04     nRBC, Absolute 11/09/2024 0.00     Diff Type 11/09/2024 Auto     QRS Duration 11/09/2024 110     [facility] QTC Calculation 11/09/2024 501      Lactic Acid 11/09/2024 2.0     ProBNP 11/09/2024 784 (H)        Last Imaging:  CT Abdomen Pelvis With IV Contrast NO Oral Contrast  Narrative: EXAMINATION:  CT ABDOMEN PELVIS WITH IV CONTRAST    CLINICAL HISTORY:  Abdominal pain, acute, nonlocalized;    TECHNIQUE:  Low dose axial images, sagittal and coronal reformations were obtained from the lung bases to the pubic symphysis following the IV administration of 100 mL of Isovue 370 .  Oral contrast was not administered.    COMPARISON:  Prior CT abdomen and pelvis without IV contrast dated 03/19/2024    FINDINGS:  Abdomen:    - Lower thorax:Mild reticulation in both lung bases, nonspecific and minimal subsegmental atelectasis in the lingula.  The heart is of normal size with no pericardial fluid.  Extensive coronary artery calcification is noted.  Partial imaging of a left axillary node is identified.    - Lung bases: No infiltrates and no nodules.    - Liver: Subcentimeter tiny hypodensity present in the left lobe of the liver too small to characterize.  Not visualized on the noncontrast enhanced prior exam, but could have been present.  Mild fatty infiltration diffusely noted throughout the liver.    - Gallbladder: Prior cholecystectomy noted with surgical clips in the gallbladder fossa.  The previously noted fluid collection in the gallbladder fossa is no longer present.    - Bile Ducts: No evidence of intra or extra hepatic biliary ductal dilation.    - Spleen: Negative.    - Kidneys: No mass or hydronephrosis.    - Adrenals: Unremarkable.    - Pancreas: Fatty replacement of the pancreas is noted.    - Retroperitoneum:  No significant adenopathy.    - Vascular: Significant vascular calcification of the abdominal aorta and branch vessels with minimal aneurysmal dilation of the distal aorta just prior to bifurcation measuring 2.3 cm in diameter with the more proximal aorta measuring 2.1 cm..    - Abdominal wall:  Unremarkable.    Pelvis:    No pelvic mass,  adenopathy, or free fluid.    Bowel/Mesentery: There is moderate fluid debris air level present within the stomach.  Assume recent ingestion of oral content.  Correlate clinically.  There are occasional sigmoid diverticula but no evidence of diverticulitis.    Otherwise, no evidence for bowel obstruction.  No free air.  The appendix is identified and is normal.    Bones:  No acute osseous abnormality and no suspicious lytic or blastic lesion.  Right hip prosthesis with subsequent artifact limiting visualization to the pelvis but study remains diagnostic.  Impression: 1. No etiology noted for abdominal pain.  2. Mild diffuse fatty infiltration of the liver and small hypodensity too small to characterize in the left lobe of the liver.  3. Fatty replacement of the pancreas noted.  4. Mild aneurysmal dilation of the distal abdominal aorta and significant vascular calcification.  5. Sigmoid diverticulosis but no evidence of diverticulitis.    Electronically signed by: Zora Oneill  Date:    11/09/2024  Time:    16:54  X-Ray Abdomen Flat And Erect  Narrative: EXAMINATION:  XR ABDOMEN FLAT AND ERECT    CLINICAL HISTORY:  Abdominal Pain;    TECHNIQUE:  Flat and erect AP views of the abdomen were performed.    COMPARISON:  08/21/2018.    FINDINGS:  There are partially visualized median sternotomy changes.  There are postop changes in the right upper abdominal quadrant.  There are changes of total right hip arthroplasty.    There are small bilateral pleural effusions.  No airspace opacities identified.    The stomach is nondistended.  The distribution of the bowel gas pattern is nonobstructive.  There are no differential air-fluid levels.  There is no evidence of pneumatosis.  No portal venous air is identified.  There is no gross pneumoperitoneum.    There are advanced vascular calcifications.  Impression: Nonobstructive bowel gas pattern.    Small bilateral pleural effusions.    Advanced vascular  calcifications.    Electronically signed by: Oumar Villagran MD  Date:    11/09/2024  Time:    15:42  X-Ray Chest AP Portable  Narrative: EXAMINATION:  XR CHEST AP PORTABLE    CLINICAL HISTORY:  CHF;.    TECHNIQUE:  Single frontal portable view of the chest was performed.    COMPARISON:  03/26/2024 portable chest    FINDINGS:  Support devices: None    There has been interval improvement in the airspace opacities with only minimal atelectasis remaining in the left base.  There is stable blunting left costophrenic angle with thickening along the left pleural space.  This finding is stable compared to chest x-ray dating back to 2019..  There has been interval near resolution of the small to moderate size right pleural effusion with minimal pleural effusion remaining.  No pneumothorax.  Trachea is midline.    The cardiac silhouette is normal in size. Surgical clips along the left hilum and mediastinum are again noted.  A radiopaque stent is again seen along the course of the left coronary artery.    Bones are intact.  Midline sternotomy wires remain aligned and intact.  Impression: 1. Interval near resolution of the right pleural effusion with trace effusion remaining.  Stable left costophrenic angle blunting, possibly scarring/pleural thickening  2. Interval resolution of bilateral airspace opacities.  No new opacities noted.  No acute abnormality.    Electronically signed by: Zora Oneill  Date:    11/09/2024  Time:    15:18         **Labs and x-rays personally reviewed by me    ** reviewed           Assessment & Plan:       1. Essential (primary) hypertension  -     Basic Metabolic Panel; Future; Expected date: 11/18/2024  -     Hemoglobin A1C; Future; Expected date: 11/18/2024    Or failure with reduced ejection fraction   Type 2 diabetes mellitus   Chronic nausea   Tobacco dependency  -     digoxin (LANOXIN) 125 mcg tablet; Take 1 tablet (0.125 mg total) by mouth once daily.  Dispense: 90 tablet; Refill: 1  -      empagliflozin (JARDIANCE) 10 mg tablet; Take 1 tablet (10 mg total) by mouth once daily.  Dispense: 30 tablet; Refill: 1  -     ondansetron (ZOFRAN) 4 MG tablet; Take 1 tablet (4 mg total) by mouth 3 (three) times daily as needed for Nausea.  Dispense: 30 tablet; Refill: 3  -     metoprolol succinate (TOPROL-XL) 25 MG 24 hr tablet; Take 0.5 tablets (12.5 mg total) by mouth once daily. Take 1/2 tablet by mouth daily  Dispense: 30 tablet; Refill: 1  -     nicotine (NICODERM CQ) 14 mg/24 hr; Place 1 patch onto the skin once daily.  Dispense: 90 patch; Refill: 1      Due to the many adverse health risks of smoking tobacco,  Patient has been encouraged to quit smoking, and smoking sensation treatments have been   treatments have been offered and a  smoking cessation class has been recommended to the patient        Walker Smith MD     within 90 days